# Patient Record
Sex: FEMALE | Race: WHITE | Employment: FULL TIME | URBAN - METROPOLITAN AREA
[De-identification: names, ages, dates, MRNs, and addresses within clinical notes are randomized per-mention and may not be internally consistent; named-entity substitution may affect disease eponyms.]

---

## 2017-10-23 RX ORDER — DROSPIRENONE, ETHINYL ESTRADIOL, LEVOMEFOLATE CALCIUM 3-0.02(24)
KIT ORAL
Qty: 84 TABLET | Refills: 3 | Status: ON HOLD | OUTPATIENT
Start: 2017-10-23 | End: 2018-09-12

## 2017-11-16 ENCOUNTER — OFFICE VISIT (OUTPATIENT)
Dept: OBGYN | Age: 42
End: 2017-11-16

## 2017-11-16 VITALS
WEIGHT: 144.8 LBS | HEIGHT: 63 IN | DIASTOLIC BLOOD PRESSURE: 68 MMHG | BODY MASS INDEX: 25.66 KG/M2 | SYSTOLIC BLOOD PRESSURE: 100 MMHG

## 2017-11-16 DIAGNOSIS — Z01.419 WELL WOMAN EXAM WITH ROUTINE GYNECOLOGICAL EXAM: Primary | ICD-10-CM

## 2017-11-16 DIAGNOSIS — Z11.51 SCREENING FOR HPV (HUMAN PAPILLOMAVIRUS): ICD-10-CM

## 2017-11-16 DIAGNOSIS — Z12.31 SCREENING MAMMOGRAM, ENCOUNTER FOR: ICD-10-CM

## 2017-11-16 PROCEDURE — 99396 PREV VISIT EST AGE 40-64: CPT | Performed by: OBSTETRICS & GYNECOLOGY

## 2017-11-16 RX ORDER — DROSPIRENONE, ETHINYL ESTRADIOL AND LEVOMEFOLATE CALCIUM AND LEVOMEFOLATE CALCIUM 3-0.02(24)
KIT ORAL
Qty: 90 TABLET | Refills: 3 | Status: SHIPPED | OUTPATIENT
Start: 2017-11-16 | End: 2018-11-27 | Stop reason: SDUPTHER

## 2017-11-16 RX ORDER — DROSPIRENONE, ETHINYL ESTRADIOL AND LEVOMEFOLATE CALCIUM AND LEVOMEFOLATE CALCIUM 3-0.02(24)
KIT ORAL
Qty: 84 TABLET | Refills: 3 | Status: CANCELLED | OUTPATIENT
Start: 2017-11-16

## 2017-11-16 NOTE — PROGRESS NOTES
on file. Medications/Contraceptives Affecting Cytology     Combination Contraceptives - Oral Disp Start End    MIMI 3-0.02-0.451 MG TABS 84 tablet 10/23/2017     Sig: TAKE 1 TABLET DAILY       Smoking status: Former Smoker                                                              Packs/day: 0.25      Years: 0.00         Start date: 1/1/1990     Quit date: 8/28/2014  Smokeless tobacco: Never Used                           Standing Status:   Future     Standing Expiration Date:   11/16/2018     Order Specific Question:   Collection Type     Answer:   SurePath     Order Specific Question:   Prior Abnormal Pap Test     Answer:   No     Order Specific Question:   Screening or Diagnostic     Answer:   Screening     Order Specific Question:   HPV Requested? Answer:   HPV Typing with HPV 16/18     Order Specific Question:   High Risk Patient     Answer:   N/A     No orders of the defined types were placed in this encounter. I, Renay Baker, am scribing for, and in the presence of, Dr Angel Gramajo. Electronically signed by: Renay Baker 11/16/17 10:55 AM    I, Dr Angel Gramajo, personally performed the services described in this documentation, as scribed by Renay Baker in my presence, and it is both accurate and complete.  Electronically signed by: Angel Gramajo MD 11/17/17 10:43 PM

## 2017-12-18 DIAGNOSIS — Z12.31 SCREENING MAMMOGRAM, ENCOUNTER FOR: ICD-10-CM

## 2018-09-11 ENCOUNTER — INITIAL CONSULT (OUTPATIENT)
Dept: SURGERY | Age: 43
End: 2018-09-11
Payer: COMMERCIAL

## 2018-09-11 ENCOUNTER — PREP FOR PROCEDURE (OUTPATIENT)
Dept: SURGERY | Age: 43
End: 2018-09-11

## 2018-09-11 ENCOUNTER — OFFICE VISIT (OUTPATIENT)
Dept: OBGYN CLINIC | Age: 43
End: 2018-09-11
Payer: COMMERCIAL

## 2018-09-11 VITALS
TEMPERATURE: 98 F | WEIGHT: 139 LBS | SYSTOLIC BLOOD PRESSURE: 130 MMHG | HEIGHT: 63 IN | OXYGEN SATURATION: 98 % | BODY MASS INDEX: 24.63 KG/M2 | DIASTOLIC BLOOD PRESSURE: 80 MMHG | HEART RATE: 88 BPM

## 2018-09-11 DIAGNOSIS — K61.1 PERIRECTAL ABSCESS: Primary | ICD-10-CM

## 2018-09-11 PROCEDURE — 99213 OFFICE O/P EST LOW 20 MIN: CPT | Performed by: OBSTETRICS & GYNECOLOGY

## 2018-09-11 PROCEDURE — 99204 OFFICE O/P NEW MOD 45 MIN: CPT | Performed by: SURGERY

## 2018-09-11 RX ORDER — ASCORBIC ACID 500 MG
500 TABLET ORAL DAILY
COMMUNITY

## 2018-09-11 RX ORDER — HEPARIN SODIUM 5000 [USP'U]/.5ML
5000 INJECTION, SOLUTION INTRAVENOUS; SUBCUTANEOUS
Status: CANCELLED | OUTPATIENT
Start: 2018-09-11 | End: 2018-09-11

## 2018-09-11 ASSESSMENT — ENCOUNTER SYMPTOMS
ABDOMINAL PAIN: 0
EYE DISCHARGE: 0
ANAL BLEEDING: 0
BACK PAIN: 0
CONSTIPATION: 0
STRIDOR: 0
TROUBLE SWALLOWING: 0
COLOR CHANGE: 0
VOMITING: 0
EYE PAIN: 0
SHORTNESS OF BREATH: 0
CHEST TIGHTNESS: 0

## 2018-09-11 NOTE — PROGRESS NOTES
by mouth      Drospiren-Eth Estrad-Levomefol (BEYAZ) 3-0.02-0.451 MG TABS TAKE 1 TABLET DAILY 90 tablet 3    MIMI 3-0.02-0.451 MG TABS TAKE 1 TABLET DAILY 84 tablet 3    folic acid (FOLVITE) 1 MG tablet       vitamin B-1 (THIAMINE) 100 MG tablet   2     No current facility-administered medications for this visit. No Known Allergies      Review of Systems   Constitutional: Negative for chills, fatigue, fever and unexpected weight change. HENT: Negative for nosebleeds and trouble swallowing. Eyes: Negative for pain and discharge. Respiratory: Negative for chest tightness, shortness of breath and stridor. Cardiovascular: Negative for chest pain, palpitations and leg swelling. Gastrointestinal: Negative for abdominal pain, anal bleeding, constipation and vomiting. Genitourinary: Negative for difficulty urinating, dysuria and hematuria. Musculoskeletal: Negative for back pain, joint swelling and neck pain. Skin: Negative for color change, rash and wound. Neurological: Negative for seizures, facial asymmetry, speech difficulty and headaches. Hematological: Negative for adenopathy. Does not bruise/bleed easily. Psychiatric/Behavioral: Negative for behavioral problems and hallucinations. The patient is not nervous/anxious. Vitals:    09/11/18 1228   BP: 130/80   Pulse: 88   Temp: 98 °F (36.7 °C)   SpO2: 98%           Physical Exam   Constitutional: She is oriented to person, place, and time. She appears well-developed and well-nourished. HENT:   Head: Normocephalic and atraumatic. Eyes: Pupils are equal, round, and reactive to light. EOM are normal.   Neck: Normal range of motion. Neck supple. Cardiovascular: Normal rate and regular rhythm. No murmur heard. Pulmonary/Chest: Effort normal and breath sounds normal. No respiratory distress. She has no wheezes. She has no rales. Abdominal: She exhibits no distension and no mass. There is no tenderness.  There is no

## 2018-09-12 ENCOUNTER — ANESTHESIA EVENT (OUTPATIENT)
Dept: OPERATING ROOM | Age: 43
End: 2018-09-12
Payer: COMMERCIAL

## 2018-09-12 ENCOUNTER — ANESTHESIA (OUTPATIENT)
Dept: OPERATING ROOM | Age: 43
End: 2018-09-12
Payer: COMMERCIAL

## 2018-09-12 ENCOUNTER — HOSPITAL ENCOUNTER (OUTPATIENT)
Age: 43
Setting detail: OUTPATIENT SURGERY
Discharge: HOME | End: 2018-09-12
Attending: SURGERY | Admitting: SURGERY
Payer: COMMERCIAL

## 2018-09-12 VITALS — SYSTOLIC BLOOD PRESSURE: 86 MMHG | DIASTOLIC BLOOD PRESSURE: 49 MMHG | OXYGEN SATURATION: 100 % | TEMPERATURE: 97.5 F

## 2018-09-12 VITALS
HEART RATE: 66 BPM | HEIGHT: 63 IN | RESPIRATION RATE: 14 BRPM | TEMPERATURE: 97.8 F | BODY MASS INDEX: 23.92 KG/M2 | WEIGHT: 135 LBS | DIASTOLIC BLOOD PRESSURE: 68 MMHG | SYSTOLIC BLOOD PRESSURE: 109 MMHG | OXYGEN SATURATION: 99 %

## 2018-09-12 LAB
HCT VFR BLD CALC: 40.3 % (ref 37–47)
HEMOGLOBIN: 13.8 G/DL (ref 12–16)
MCH RBC QN AUTO: 31.2 PG (ref 27–31.3)
MCHC RBC AUTO-ENTMCNC: 34.4 % (ref 33–37)
MCV RBC AUTO: 90.8 FL (ref 82–100)
PDW BLD-RTO: 12.2 % (ref 11.5–14.5)
PLATELET # BLD: 278 K/UL (ref 130–400)
RBC # BLD: 4.43 M/UL (ref 4.2–5.4)
WBC # BLD: 11.4 K/UL (ref 4.8–10.8)

## 2018-09-12 PROCEDURE — 2500000003 HC RX 250 WO HCPCS: Performed by: NURSE ANESTHETIST, CERTIFIED REGISTERED

## 2018-09-12 PROCEDURE — 87205 SMEAR GRAM STAIN: CPT

## 2018-09-12 PROCEDURE — 7100000001 HC PACU RECOVERY - ADDTL 15 MIN: Performed by: SURGERY

## 2018-09-12 PROCEDURE — 6360000002 HC RX W HCPCS: Performed by: SURGERY

## 2018-09-12 PROCEDURE — 6360000002 HC RX W HCPCS: Performed by: NURSE ANESTHETIST, CERTIFIED REGISTERED

## 2018-09-12 PROCEDURE — 7100000000 HC PACU RECOVERY - FIRST 15 MIN: Performed by: SURGERY

## 2018-09-12 PROCEDURE — 7100000010 HC PHASE II RECOVERY - FIRST 15 MIN: Performed by: SURGERY

## 2018-09-12 PROCEDURE — 87075 CULTR BACTERIA EXCEPT BLOOD: CPT

## 2018-09-12 PROCEDURE — 3600000003 HC SURGERY LEVEL 3 BASE: Performed by: SURGERY

## 2018-09-12 PROCEDURE — 7100000011 HC PHASE II RECOVERY - ADDTL 15 MIN: Performed by: SURGERY

## 2018-09-12 PROCEDURE — 85027 COMPLETE CBC AUTOMATED: CPT

## 2018-09-12 PROCEDURE — 46040 I&D ISCHIORCT&/PERIRCT ABSC: CPT | Performed by: SURGERY

## 2018-09-12 PROCEDURE — 3700000001 HC ADD 15 MINUTES (ANESTHESIA): Performed by: SURGERY

## 2018-09-12 PROCEDURE — 2580000003 HC RX 258

## 2018-09-12 PROCEDURE — 3700000000 HC ANESTHESIA ATTENDED CARE: Performed by: SURGERY

## 2018-09-12 PROCEDURE — 2580000003 HC RX 258: Performed by: SURGERY

## 2018-09-12 PROCEDURE — 3600000013 HC SURGERY LEVEL 3 ADDTL 15MIN: Performed by: SURGERY

## 2018-09-12 PROCEDURE — 87185 SC STD ENZYME DETCJ PER NZM: CPT

## 2018-09-12 PROCEDURE — 87186 SC STD MICRODIL/AGAR DIL: CPT

## 2018-09-12 PROCEDURE — 2709999900 HC NON-CHARGEABLE SUPPLY: Performed by: SURGERY

## 2018-09-12 PROCEDURE — 87077 CULTURE AEROBIC IDENTIFY: CPT

## 2018-09-12 PROCEDURE — A4648 IMPLANTABLE TISSUE MARKER: HCPCS | Performed by: SURGERY

## 2018-09-12 PROCEDURE — 87070 CULTURE OTHR SPECIMN AEROBIC: CPT

## 2018-09-12 PROCEDURE — 6370000000 HC RX 637 (ALT 250 FOR IP): Performed by: ANESTHESIOLOGY

## 2018-09-12 RX ORDER — FENTANYL CITRATE 50 UG/ML
INJECTION, SOLUTION INTRAMUSCULAR; INTRAVENOUS PRN
Status: DISCONTINUED | OUTPATIENT
Start: 2018-09-12 | End: 2018-09-12 | Stop reason: SDUPTHER

## 2018-09-12 RX ORDER — DEXAMETHASONE SODIUM PHOSPHATE 10 MG/ML
INJECTION INTRAMUSCULAR; INTRAVENOUS PRN
Status: DISCONTINUED | OUTPATIENT
Start: 2018-09-12 | End: 2018-09-12 | Stop reason: SDUPTHER

## 2018-09-12 RX ORDER — METOCLOPRAMIDE HYDROCHLORIDE 5 MG/ML
10 INJECTION INTRAMUSCULAR; INTRAVENOUS
Status: DISCONTINUED | OUTPATIENT
Start: 2018-09-12 | End: 2018-09-12 | Stop reason: HOSPADM

## 2018-09-12 RX ORDER — DEXTROSE, SODIUM CHLORIDE, SODIUM LACTATE, POTASSIUM CHLORIDE, AND CALCIUM CHLORIDE 5; .6; .31; .03; .02 G/100ML; G/100ML; G/100ML; G/100ML; G/100ML
INJECTION, SOLUTION INTRAVENOUS CONTINUOUS
Status: DISCONTINUED | OUTPATIENT
Start: 2018-09-12 | End: 2018-09-12 | Stop reason: HOSPADM

## 2018-09-12 RX ORDER — DIPHENHYDRAMINE HYDROCHLORIDE 50 MG/ML
12.5 INJECTION INTRAMUSCULAR; INTRAVENOUS
Status: DISCONTINUED | OUTPATIENT
Start: 2018-09-12 | End: 2018-09-12 | Stop reason: HOSPADM

## 2018-09-12 RX ORDER — HEPARIN SODIUM 5000 [USP'U]/ML
5000 INJECTION, SOLUTION INTRAVENOUS; SUBCUTANEOUS
Status: COMPLETED | OUTPATIENT
Start: 2018-09-12 | End: 2018-09-12

## 2018-09-12 RX ORDER — KETOROLAC TROMETHAMINE 30 MG/ML
INJECTION, SOLUTION INTRAMUSCULAR; INTRAVENOUS PRN
Status: DISCONTINUED | OUTPATIENT
Start: 2018-09-12 | End: 2018-09-12 | Stop reason: SDUPTHER

## 2018-09-12 RX ORDER — ACETAMINOPHEN AND CODEINE PHOSPHATE 300; 30 MG/1; MG/1
2 TABLET ORAL EVERY 4 HOURS PRN
Status: DISCONTINUED | OUTPATIENT
Start: 2018-09-12 | End: 2018-09-12 | Stop reason: HOSPADM

## 2018-09-12 RX ORDER — PROPOFOL 10 MG/ML
INJECTION, EMULSION INTRAVENOUS PRN
Status: DISCONTINUED | OUTPATIENT
Start: 2018-09-12 | End: 2018-09-12 | Stop reason: SDUPTHER

## 2018-09-12 RX ORDER — KETOROLAC TROMETHAMINE 30 MG/ML
30 INJECTION, SOLUTION INTRAMUSCULAR; INTRAVENOUS EVERY 6 HOURS
Status: DISCONTINUED | OUTPATIENT
Start: 2018-09-12 | End: 2018-09-12 | Stop reason: HOSPADM

## 2018-09-12 RX ORDER — SODIUM CHLORIDE, SODIUM LACTATE, POTASSIUM CHLORIDE, CALCIUM CHLORIDE 600; 310; 30; 20 MG/100ML; MG/100ML; MG/100ML; MG/100ML
INJECTION, SOLUTION INTRAVENOUS
Status: COMPLETED
Start: 2018-09-12 | End: 2018-09-12

## 2018-09-12 RX ORDER — MIDAZOLAM HYDROCHLORIDE 1 MG/ML
INJECTION INTRAMUSCULAR; INTRAVENOUS PRN
Status: DISCONTINUED | OUTPATIENT
Start: 2018-09-12 | End: 2018-09-12 | Stop reason: SDUPTHER

## 2018-09-12 RX ORDER — MEPERIDINE HYDROCHLORIDE 25 MG/ML
12.5 INJECTION INTRAMUSCULAR; INTRAVENOUS; SUBCUTANEOUS EVERY 5 MIN PRN
Status: DISCONTINUED | OUTPATIENT
Start: 2018-09-12 | End: 2018-09-12 | Stop reason: HOSPADM

## 2018-09-12 RX ORDER — HYDROCODONE BITARTRATE AND ACETAMINOPHEN 5; 325 MG/1; MG/1
1 TABLET ORAL PRN
Status: COMPLETED | OUTPATIENT
Start: 2018-09-12 | End: 2018-09-12

## 2018-09-12 RX ORDER — ONDANSETRON 2 MG/ML
4 INJECTION INTRAMUSCULAR; INTRAVENOUS
Status: DISCONTINUED | OUTPATIENT
Start: 2018-09-12 | End: 2018-09-12 | Stop reason: HOSPADM

## 2018-09-12 RX ORDER — MORPHINE SULFATE 2 MG/ML
2 INJECTION, SOLUTION INTRAMUSCULAR; INTRAVENOUS
Status: DISCONTINUED | OUTPATIENT
Start: 2018-09-12 | End: 2018-09-12 | Stop reason: HOSPADM

## 2018-09-12 RX ORDER — ONDANSETRON 2 MG/ML
INJECTION INTRAMUSCULAR; INTRAVENOUS PRN
Status: DISCONTINUED | OUTPATIENT
Start: 2018-09-12 | End: 2018-09-12 | Stop reason: SDUPTHER

## 2018-09-12 RX ORDER — ACETAMINOPHEN AND CODEINE PHOSPHATE 300; 30 MG/1; MG/1
1 TABLET ORAL EVERY 4 HOURS PRN
Status: DISCONTINUED | OUTPATIENT
Start: 2018-09-12 | End: 2018-09-12 | Stop reason: HOSPADM

## 2018-09-12 RX ORDER — LIDOCAINE HYDROCHLORIDE 20 MG/ML
INJECTION, SOLUTION EPIDURAL; INFILTRATION; INTRACAUDAL; PERINEURAL PRN
Status: DISCONTINUED | OUTPATIENT
Start: 2018-09-12 | End: 2018-09-12 | Stop reason: SDUPTHER

## 2018-09-12 RX ORDER — SODIUM CHLORIDE, SODIUM LACTATE, POTASSIUM CHLORIDE, CALCIUM CHLORIDE 600; 310; 30; 20 MG/100ML; MG/100ML; MG/100ML; MG/100ML
INJECTION, SOLUTION INTRAVENOUS CONTINUOUS
Status: DISCONTINUED | OUTPATIENT
Start: 2018-09-12 | End: 2018-09-12 | Stop reason: HOSPADM

## 2018-09-12 RX ORDER — FENTANYL CITRATE 50 UG/ML
50 INJECTION, SOLUTION INTRAMUSCULAR; INTRAVENOUS EVERY 10 MIN PRN
Status: DISCONTINUED | OUTPATIENT
Start: 2018-09-12 | End: 2018-09-12 | Stop reason: HOSPADM

## 2018-09-12 RX ORDER — HYDROCODONE BITARTRATE AND ACETAMINOPHEN 5; 325 MG/1; MG/1
2 TABLET ORAL PRN
Status: COMPLETED | OUTPATIENT
Start: 2018-09-12 | End: 2018-09-12

## 2018-09-12 RX ORDER — MORPHINE SULFATE 4 MG/ML
4 INJECTION, SOLUTION INTRAMUSCULAR; INTRAVENOUS
Status: DISCONTINUED | OUTPATIENT
Start: 2018-09-12 | End: 2018-09-12 | Stop reason: HOSPADM

## 2018-09-12 RX ADMIN — CEFOXITIN SODIUM 1 G: 1 POWDER, FOR SOLUTION INTRAVENOUS at 12:34

## 2018-09-12 RX ADMIN — MIDAZOLAM HYDROCHLORIDE 2 MG: 1 INJECTION, SOLUTION INTRAMUSCULAR; INTRAVENOUS at 12:28

## 2018-09-12 RX ADMIN — SODIUM CHLORIDE, POTASSIUM CHLORIDE, SODIUM LACTATE AND CALCIUM CHLORIDE: 600; 310; 30; 20 INJECTION, SOLUTION INTRAVENOUS at 10:17

## 2018-09-12 RX ADMIN — HYDROCODONE BITARTRATE AND ACETAMINOPHEN 1 TABLET: 5; 325 TABLET ORAL at 14:33

## 2018-09-12 RX ADMIN — FENTANYL CITRATE 25 MCG: 50 INJECTION, SOLUTION INTRAMUSCULAR; INTRAVENOUS at 12:46

## 2018-09-12 RX ADMIN — ONDANSETRON HYDROCHLORIDE 4 MG: 2 INJECTION, SOLUTION INTRAMUSCULAR; INTRAVENOUS at 12:39

## 2018-09-12 RX ADMIN — LIDOCAINE HYDROCHLORIDE 100 MG: 20 INJECTION, SOLUTION EPIDURAL; INFILTRATION; INTRACAUDAL; PERINEURAL at 12:30

## 2018-09-12 RX ADMIN — SODIUM CHLORIDE, SODIUM LACTATE, POTASSIUM CHLORIDE, CALCIUM CHLORIDE: 600; 310; 30; 20 INJECTION, SOLUTION INTRAVENOUS at 10:17

## 2018-09-12 RX ADMIN — PROPOFOL 200 MG: 10 INJECTION, EMULSION INTRAVENOUS at 12:30

## 2018-09-12 RX ADMIN — KETOROLAC TROMETHAMINE 30 MG: 30 INJECTION, SOLUTION INTRAMUSCULAR at 12:39

## 2018-09-12 RX ADMIN — HEPARIN SODIUM 5000 UNITS: 5000 INJECTION, SOLUTION INTRAVENOUS; SUBCUTANEOUS at 10:42

## 2018-09-12 RX ADMIN — DEXAMETHASONE SODIUM PHOSPHATE 10 MG: 10 INJECTION INTRAMUSCULAR; INTRAVENOUS at 12:34

## 2018-09-12 RX ADMIN — FENTANYL CITRATE 25 MCG: 50 INJECTION, SOLUTION INTRAMUSCULAR; INTRAVENOUS at 12:30

## 2018-09-12 ASSESSMENT — PAIN DESCRIPTION - DESCRIPTORS: DESCRIPTORS: BURNING

## 2018-09-12 ASSESSMENT — PULMONARY FUNCTION TESTS
PIF_VALUE: 10
PIF_VALUE: 3
PIF_VALUE: 2
PIF_VALUE: 12
PIF_VALUE: 10
PIF_VALUE: 1
PIF_VALUE: 12
PIF_VALUE: 2
PIF_VALUE: 1
PIF_VALUE: 10
PIF_VALUE: 2
PIF_VALUE: 10
PIF_VALUE: 12
PIF_VALUE: 10
PIF_VALUE: 10
PIF_VALUE: 3
PIF_VALUE: 2
PIF_VALUE: 1
PIF_VALUE: 2
PIF_VALUE: 24
PIF_VALUE: 12
PIF_VALUE: 12
PIF_VALUE: 2
PIF_VALUE: 1

## 2018-09-12 ASSESSMENT — PAIN - FUNCTIONAL ASSESSMENT: PAIN_FUNCTIONAL_ASSESSMENT: 0-10

## 2018-09-12 ASSESSMENT — PAIN SCALES - GENERAL
PAINLEVEL_OUTOF10: 1
PAINLEVEL_OUTOF10: 0

## 2018-09-12 NOTE — H&P
[address]                       [address], [address] [facility]                               HISTORY AND PHYSICAL    PATIENT NAME: Marla Helton                    :        1975  MED REC NO:   [id number]                            ROOM:  ACCOUNT NO:   [de-identified]                           ADMIT DATE: 2018  PROVIDER:     Ketty Aldana MD    CHIEF COMPLAINT:  Perirectal abscess. HISTORY OF PRESENT ILLNESS:  The patient is a 78-year-old female with  perirectal abscess. She presents now for incision and drainage. Patient has been sick for 4 or 5 days. She has been on oral antibiotics. Prior exam in the office demonstrates an abscess. She presents now for  incision and drainage. The patient has never had the problem before. The patient denies any chronic issues with rectal bleeding, diarrhea,  change in bowel habits. She never had a colonoscopy. There is no family  history of colon disease. PAST MEDICAL HISTORY:  The patient denies any hypertension, diabetes or  coronary artery disease. SOCIAL HISTORY:  She does not smoke. The patient has prior history of  alcohol abuse. She has been sober for 3 years. CURRENT MEDICATIONS:  Include oral contraceptives and vitamins. ALLERGIES:  NKDA. PHYSICAL EXAMINATION:  GENERAL:  Female in mild discomfort. HEENT:  Sclerae are clear. NECK:  No cervical adenopathy. No neck mass. CHEST:  Clear. CARDIAC:  No S3 or murmur. ABDOMEN:  Soft and nontender. RECTAL:  Rectal exam was performed with female nursing personnel present as  chaperone. There is an abscess to the left and anterior of the anus. Digital exam was not performed. ASSESSMENT:  Perirectal abscess. PLAN:  The patient will undergo an incision and drainage in the operating  room under general anesthesia. Risks, benefits, indications for the surgery. Anticipated convalescence  was discussed.   I have told the

## 2018-09-12 NOTE — PROGRESS NOTES
Disch instr reviewed with patient and her daughter. Portable sitz bath set up given to patient, and instructed on use. Both verbalize understanding & would like to proceed with discharge.

## 2018-09-13 LAB
GRAM STAIN RESULT: ABNORMAL
ORGANISM: ABNORMAL
WOUND/ABSCESS: ABNORMAL
WOUND/ABSCESS: ABNORMAL

## 2018-09-13 NOTE — OP NOTE
T: 09/12/2018 21:57:37     [id number]  Job#: [id number]     Doc#: [id number]    CC:  MD Karen Mccarty MD

## 2018-09-14 VITALS
DIASTOLIC BLOOD PRESSURE: 80 MMHG | HEIGHT: 63 IN | WEIGHT: 139 LBS | TEMPERATURE: 98.9 F | BODY MASS INDEX: 24.63 KG/M2 | SYSTOLIC BLOOD PRESSURE: 130 MMHG

## 2018-09-14 LAB
ANAEROBIC CULTURE: ABNORMAL
CULTURE SURGICAL: ABNORMAL
CULTURE SURGICAL: ABNORMAL
GRAM STAIN RESULT: ABNORMAL
ORGANISM: ABNORMAL

## 2018-09-14 ASSESSMENT — ENCOUNTER SYMPTOMS
CONSTIPATION: 0
COUGH: 0
BLOOD IN STOOL: 0
WHEEZING: 0
SHORTNESS OF BREATH: 0
DIARRHEA: 0
ABDOMINAL PAIN: 0

## 2018-09-14 NOTE — PROGRESS NOTES
History and Physical  [facility] [address]  P: [phone] / F: [phone]  Nj oJhns  2018              37 y.o. Chief Complaint   Patient presents with    Mass     existing pt states that 18 started with lump on her left buttock and concerned that now the size has increased and in pain    Student     yes       /80   Temp 98.9 °F (37.2 °C)   Ht 5' 3\" (1.6 m)   Wt 139 lb (63 kg)   LMP 2018   BMI 24.62 kg/m²   Alllergies:  Patient has no known allergies. Primary Care Physician: Rupert Nicolas MD    HPI : Nj Johns 37 y.o. [place] Ethiop[place] female  Pt here today with c/o small area on her left buttock that has grown larger and became more uncomfortable over the last 6 days. Pt states the pain and discomfort worsens when she does more rigorous activity such as workouts, lunges and training at the gym. She states she now has a large painful mass, pain intensify's when sitting. On exam pt with an 8 cm ishiorectal fossa, full and tense with mehrdad rectal abscess which was draining a small amount purulent fluid into the recto anal area when milking with one finger from the vagina and putting pressure on the buttock area. Area is exquisitely tender and fluctuant, culture obtained and sent to lab. Pt is afebrile, she denies chills. Call placed to Dr. Dave Barron office (gen surg) for same day consult. Pt sent directly to general surgery following this appointment. All? S answered   ________________________________________________________________________   Obstetric History       T0      L0     SAB0   TAB0   Ectopic0   Molar0   Multiple0   Live Births0         Past Medical History:   Diagnosis Date    Anxiety disorder     DDD (degenerative disc disease), lumbar     Peripheral neuropathy     alcohol induced    Radiculopathy     Recovering alcoholic ([address] difficulty urinating, dysuria, flank pain and hematuria. Skin: Negative. Psychiatric/Behavioral: Negative. PHYSICAL Exam:    Constitutional:  Vitals:    09/11/18 1127   BP: 130/80   Temp: 98.9 °F (37.2 °C)   Weight: 139 lb (63 kg)   Height: 5' 3\" (1.6 m)       Physical Exam   Constitutional: She is oriented to person, place, and time. She appears well-developed and well-nourished. HENT:   Head: Normocephalic and atraumatic. Cardiovascular: Normal rate and regular rhythm. Abdominal: Soft. Normal appearance. There is no tenderness. Musculoskeletal: Normal range of motion. Neurological: She is alert and oriented to person, place, and time. Skin: Skin is warm and intact. No lesion noted. No erythema. Psychiatric: She has a normal mood and affect. Her behavior is normal. Judgment and thought content normal.         ASSESSMENT:      37 y.o. Annual   Diagnosis Orders   1. Bump  Wound Culture                    Counseling Completed:          PLAN:      Orders Placed This Encounter   Procedures    Wound Culture     perirectal     Standing Status:   Future     Number of Occurrences:   1     Standing Expiration Date:   9/11/2019     No orders of the defined types were placed in this encounter. IDeandre, am scribing for, and in the presence of, Dr Paolo Stroud. Electronically signed by: Deandre Lima. 9/11/18      TONO, Dr Paolo Stroud, personally performed the services described in this documentation, as scribed by Deandre Lima in my presence, and it is both accurate and complete.  Electronically signed by: Paolo Stroud MD 9/28/18 7:03 AM

## 2018-09-18 ENCOUNTER — OFFICE VISIT (OUTPATIENT)
Dept: SURGERY | Age: 43
End: 2018-09-18

## 2018-09-18 VITALS
HEART RATE: 66 BPM | OXYGEN SATURATION: 99 % | TEMPERATURE: 98.7 F | HEIGHT: 63 IN | BODY MASS INDEX: 24.17 KG/M2 | WEIGHT: 136.4 LBS | SYSTOLIC BLOOD PRESSURE: 110 MMHG | DIASTOLIC BLOOD PRESSURE: 70 MMHG

## 2018-09-18 DIAGNOSIS — K61.1 PERIRECTAL ABSCESS: Primary | ICD-10-CM

## 2018-09-18 PROCEDURE — 99024 POSTOP FOLLOW-UP VISIT: CPT | Performed by: SURGERY

## 2018-09-18 RX ORDER — AMOXICILLIN AND CLAVULANATE POTASSIUM 875; 125 MG/1; MG/1
TABLET, FILM COATED ORAL
Refills: 0 | COMMUNITY
Start: 2018-09-12 | End: 2018-12-14

## 2018-10-16 ENCOUNTER — OFFICE VISIT (OUTPATIENT)
Dept: SURGERY | Age: 43
End: 2018-10-16

## 2018-10-16 VITALS
BODY MASS INDEX: 23.39 KG/M2 | OXYGEN SATURATION: 98 % | DIASTOLIC BLOOD PRESSURE: 68 MMHG | TEMPERATURE: 98.8 F | WEIGHT: 137 LBS | HEART RATE: 74 BPM | SYSTOLIC BLOOD PRESSURE: 108 MMHG | HEIGHT: 64 IN

## 2018-10-16 DIAGNOSIS — K61.1 PERIRECTAL ABSCESS: Primary | ICD-10-CM

## 2018-10-16 PROCEDURE — 99024 POSTOP FOLLOW-UP VISIT: CPT | Performed by: SURGERY

## 2018-10-16 NOTE — PROGRESS NOTES
S/P 9/12 I&D perirectal abscess        Mostly healed  No pain  Still draining    PE/    Nursing personnel present during the physical exam acting as chaperone.       I&D site open for several mm  Scant drainage    No fluctuance      A/ generally improved    Possible fistula    P/    Colonoscopy with DR Daniel Vera to r/o colorectal disease    If drainage persists , then needs evaluation for fistula

## 2018-10-18 RX ORDER — SODIUM, POTASSIUM,MAG SULFATES 17.5-3.13G
SOLUTION, RECONSTITUTED, ORAL ORAL
Qty: 1 BOTTLE | Refills: 0 | Status: SHIPPED | OUTPATIENT
Start: 2018-10-18 | End: 2019-12-03 | Stop reason: CLARIF

## 2018-11-19 ENCOUNTER — OUTSIDE SERVICES (OUTPATIENT)
Dept: GASTROENTEROLOGY | Age: 43
End: 2018-11-19
Payer: COMMERCIAL

## 2018-11-19 DIAGNOSIS — K61.1 PERIRECTAL ABSCESS: Primary | ICD-10-CM

## 2018-11-19 PROCEDURE — 45380 COLONOSCOPY AND BIOPSY: CPT | Performed by: INTERNAL MEDICINE

## 2018-11-19 NOTE — OP NOTE
Colonoscopy Procedure Note      Patient: Moira Nelson  : 1975    Procedure: Colonoscopy with polypectomy (cold biopsy)    Date:  2018    Surgeon:  Carlyn Sutton MD    Referring Physician:  Igor Hyde MD    Preoperative Diagnosis:  History of perirectal abscess, colonoscopy to exclude IBD    Postoperative Diagnosis:  Melanosis coli, diminutive rectal polyp, small internal hemorrhoids, medium size external hemorrhoids, anal fissure    Consent:  The patient or their legal guardian has signed a consent, and is aware of the potential risks, benefits, alternatives, and potential complications of this procedure. These include, but are not limited to hemorrhage, bleeding, post procedural pain, perforation, phlebitis, aspiration, hypotension, hypoxia, cardiovascular events such as arryhthmia, and possibly death. Additionally, the possibility of missed colonic polyps and interval colon cancer was discussed in the consent. Anesthesia:  MAC    Procedure: An informed consent was obtained from the patient after explanation of indications, benefits, possible risks and complications of the procedure. The patient was then taken to the endoscopy suite, placed in the left lateral decubitus position, and the above IV anesthesia was administered. A digital rectal examination was performed and revealed external hemorrhoids noted, evidence for anal fissure versus persisting perirectal abscess with mild drainage. The Olympus video colonoscope was placed in the patient's rectum under digital direction and advanced to the cecum. The cecum was identified by characteristic anatomy and confirmed with presence ileo-cecal valve, appendical orifice and transillumination of right lower quadrant. Photo documentation of cecum was performed. The prep was good. The scope was then withdrawn back through the cecum, ascending, transverse, descending, sigmoid colon, and rectum.   Careful circumferential

## 2018-11-23 ENCOUNTER — OFFICE VISIT (OUTPATIENT)
Dept: SURGERY | Age: 43
End: 2018-11-23
Payer: COMMERCIAL

## 2018-11-23 VITALS — BODY MASS INDEX: 24.07 KG/M2 | WEIGHT: 141 LBS | HEIGHT: 64 IN | TEMPERATURE: 97.7 F

## 2018-11-23 DIAGNOSIS — K60.3 ANAL FISTULA: Primary | ICD-10-CM

## 2018-11-23 PROCEDURE — 99203 OFFICE O/P NEW LOW 30 MIN: CPT | Performed by: COLON & RECTAL SURGERY

## 2018-11-23 PROCEDURE — 46600 DIAGNOSTIC ANOSCOPY SPX: CPT | Performed by: COLON & RECTAL SURGERY

## 2018-11-23 ASSESSMENT — ENCOUNTER SYMPTOMS
BLOOD IN STOOL: 0
ANAL BLEEDING: 0
ABDOMINAL PAIN: 0
SHORTNESS OF BREATH: 0
CHEST TIGHTNESS: 0
NAUSEA: 0
RECTAL PAIN: 0
ABDOMINAL DISTENTION: 0

## 2018-11-23 NOTE — PROGRESS NOTES
treatment versus exam under anesthesia with anal fistulotomy versus seton placement. Risks of infection bleeding and recurrence were outlined. Damage to the sphincter resulting in incontinence to gas or liquid stool was addressed as well. She understands the risks but also the benefits and wishes to proceed. Plan for operating room for exam under anesthesia and fistulotomy versus seton placement. Please note this report has beenpartially produced using speech recognition software and may cause contain errors related to that system including grammar, punctuation and spelling as well as words and phrases that may seem inappropriate.  If there arequestions or concerns please feel free to contact me to clarify

## 2018-11-27 ENCOUNTER — OFFICE VISIT (OUTPATIENT)
Dept: OBGYN CLINIC | Age: 43
End: 2018-11-27
Payer: COMMERCIAL

## 2018-11-27 VITALS
SYSTOLIC BLOOD PRESSURE: 118 MMHG | WEIGHT: 135 LBS | BODY MASS INDEX: 23.05 KG/M2 | DIASTOLIC BLOOD PRESSURE: 70 MMHG | HEIGHT: 64 IN

## 2018-11-27 DIAGNOSIS — Z11.51 SCREENING FOR HUMAN PAPILLOMAVIRUS: ICD-10-CM

## 2018-11-27 DIAGNOSIS — Z01.419 PAP SMEAR, AS PART OF ROUTINE GYNECOLOGICAL EXAMINATION: Primary | ICD-10-CM

## 2018-11-27 DIAGNOSIS — Z12.31 SCREENING MAMMOGRAM, ENCOUNTER FOR: ICD-10-CM

## 2018-11-27 PROCEDURE — 99396 PREV VISIT EST AGE 40-64: CPT | Performed by: OBSTETRICS & GYNECOLOGY

## 2018-11-27 ASSESSMENT — ENCOUNTER SYMPTOMS
ABDOMINAL PAIN: 0
VOMITING: 0
RECTAL PAIN: 0
ALLERGIC/IMMUNOLOGIC NEGATIVE: 1
BLOOD IN STOOL: 0
RESPIRATORY NEGATIVE: 1
CONSTIPATION: 0
NAUSEA: 0
ABDOMINAL DISTENTION: 0
EYES NEGATIVE: 1
DIARRHEA: 0
ANAL BLEEDING: 0

## 2018-11-27 ASSESSMENT — PATIENT HEALTH QUESTIONNAIRE - PHQ9
1. LITTLE INTEREST OR PLEASURE IN DOING THINGS: 0
SUM OF ALL RESPONSES TO PHQ QUESTIONS 1-9: 0
2. FEELING DOWN, DEPRESSED OR HOPELESS: 0
SUM OF ALL RESPONSES TO PHQ QUESTIONS 1-9: 0
SUM OF ALL RESPONSES TO PHQ9 QUESTIONS 1 & 2: 0

## 2018-11-27 NOTE — PROGRESS NOTES
Subjective:      Patient ID: Bella Hankins is a 37 y.o. female    Annual exam.  No GYN complaints. Did develop anal fistula from recent mehrdad-rectal abscess and having repair soon. Normal cycles. Pap performed and screening mammogram ordered. STD screening offered. Monthly SBE encouraged. F/U annual or prn. Vitals:  /70   Ht 5' 3.5\" (1.613 m)   Wt 135 lb (61.2 kg)   LMP 11/18/2018   BMI 23.54 kg/m²   Past Medical History:   Diagnosis Date    Anxiety disorder     DDD (degenerative disc disease), lumbar     Fistula     Anal     Peripheral neuropathy     alcohol induced    Radiculopathy     Recovering alcoholic ([address]      Past Surgical History:   Procedure Laterality Date    COLONOSCOPY      IA I&D RECTAL SUBMUCOSAL ABSCESS N/A 9/12/2018    RECTAL ABSCESS INCISION AND DRAINAGE, PAT ON ADMIT performed by Flaquita Cortes MD at [facility]:  Patient has no known allergies. Current Outpatient Prescriptions   Medication Sig Dispense Refill    Na Sulfate-K Sulfate-Mg Sulf 17.5-3.13-1.6 GM/180ML SOLN Use as directed in split fashion 1 Bottle 0    amoxicillin-clavulanate (AUGMENTIN) 875-125 MG per tablet TAKE 1 TABLET TWICE DAILY  0    vitamin D (CHOLECALCIFEROL) 1000 UNIT TABS tablet Take 1,000 Units by mouth daily      vitamin C (ASCORBIC ACID) 500 MG tablet Take 500 mg by mouth daily      Glucosamine-Chondroitin (GLUCOSAMINE CHONDR COMPLEX PO) Take by mouth      Drospiren-Eth Estrad-Levomefol (BEYAZ) 3-0.02-0.451 MG TABS TAKE 1 TABLET DAILY 90 tablet 3    folic acid (FOLVITE) 1 MG tablet       vitamin B-1 (THIAMINE) 100 MG tablet   2     No current facility-administered medications for this visit. Social History     Social History    Marital status:      Spouse name: N/A    Number of children: N/A    Years of education: N/A     Occupational History    Not on file.      Social History Main Topics    Smoking status: Current Every Day Smoker     Packs/day: 0.25     Start date: 1990     Last attempt to quit: 2014    Smokeless tobacco: Never Used    Alcohol use No      Comment: former alcoholic     Drug use: No    Sexual activity: Yes     Partners: Male     Birth control/ protection: Pill      Comment: OCP     Other Topics Concern    Not on file     Social History Narrative    No narrative on file     Family History   Problem Relation Age of Onset    Heart Disease Father     High Blood Pressure Father     Other Father         pancreatitis    Diabetes Maternal Grandmother     Diabetes Maternal Grandfather     Cancer Mother         [place] No Known Problems Paternal Grandfather     No Known Problems Paternal Grandmother     No Known Problems Brother     No Known Problems Sister     No Known Problems Other     Breast Cancer Neg Hx     Colon Cancer Neg Hx     Eclampsia Neg Hx     Hypertension Neg Hx     Ovarian Cancer Neg Hx      Labor Neg Hx     Spont Abortions Neg Hx     Stroke Neg Hx        Review of Systems   Constitutional: Negative. Negative for activity change, appetite change, chills, diaphoresis, fatigue, fever and unexpected weight change. HENT: Negative. Eyes: Negative. Respiratory: Negative. Cardiovascular: Negative. Gastrointestinal: Negative for abdominal distention, abdominal pain, anal bleeding, blood in stool, constipation, diarrhea, nausea, rectal pain and vomiting. Endocrine: Negative. Genitourinary: Negative for decreased urine volume, difficulty urinating, dyspareunia, dysuria, enuresis, flank pain, frequency, genital sores, hematuria, menstrual problem, pelvic pain, urgency, vaginal bleeding, vaginal discharge and vaginal pain. Musculoskeletal: Negative. Skin: Negative. Allergic/Immunologic: Negative. Neurological: Negative. Hematological: Negative. Psychiatric/Behavioral: Negative.         Objective:     Physical Exam   Constitutional: She is oriented to person, place, and

## 2018-11-29 RX ORDER — DROSPIRENONE, ETHINYL ESTRADIOL, LEVOMEFOLATE CALCIUM 3-0.02(24)
KIT ORAL
Qty: 84 TABLET | Refills: 3 | Status: SHIPPED | OUTPATIENT
Start: 2018-11-29 | End: 2019-10-31 | Stop reason: SDUPTHER

## 2018-12-06 DIAGNOSIS — Z01.419 PAP SMEAR, AS PART OF ROUTINE GYNECOLOGICAL EXAMINATION: ICD-10-CM

## 2018-12-06 DIAGNOSIS — Z11.51 SCREENING FOR HUMAN PAPILLOMAVIRUS: ICD-10-CM

## 2018-12-14 ENCOUNTER — OFFICE VISIT (OUTPATIENT)
Dept: SURGERY | Age: 43
End: 2018-12-14

## 2018-12-14 VITALS
DIASTOLIC BLOOD PRESSURE: 70 MMHG | WEIGHT: 136 LBS | HEIGHT: 64 IN | SYSTOLIC BLOOD PRESSURE: 110 MMHG | TEMPERATURE: 98.2 F | BODY MASS INDEX: 23.22 KG/M2

## 2018-12-14 DIAGNOSIS — K60.3 ANAL FISTULA: Primary | ICD-10-CM

## 2018-12-14 PROCEDURE — 99024 POSTOP FOLLOW-UP VISIT: CPT | Performed by: COLON & RECTAL SURGERY

## 2018-12-14 ASSESSMENT — ENCOUNTER SYMPTOMS
SHORTNESS OF BREATH: 0
ABDOMINAL DISTENTION: 0
CHEST TIGHTNESS: 0
ANAL BLEEDING: 0
RECTAL PAIN: 0
VOMITING: 0

## 2019-01-04 ENCOUNTER — OFFICE VISIT (OUTPATIENT)
Dept: SURGERY | Age: 44
End: 2019-01-04

## 2019-01-04 VITALS
BODY MASS INDEX: 24.63 KG/M2 | WEIGHT: 139 LBS | DIASTOLIC BLOOD PRESSURE: 78 MMHG | SYSTOLIC BLOOD PRESSURE: 116 MMHG | TEMPERATURE: 98.2 F | HEIGHT: 63 IN

## 2019-01-04 DIAGNOSIS — K60.3 ANAL FISTULA: Primary | ICD-10-CM

## 2019-01-04 PROCEDURE — 99024 POSTOP FOLLOW-UP VISIT: CPT | Performed by: COLON & RECTAL SURGERY

## 2019-01-04 ASSESSMENT — ENCOUNTER SYMPTOMS
SHORTNESS OF BREATH: 0
ANAL BLEEDING: 0
CONSTIPATION: 0
DIARRHEA: 0
ABDOMINAL PAIN: 0
ABDOMINAL DISTENTION: 0

## 2019-02-01 ENCOUNTER — OFFICE VISIT (OUTPATIENT)
Dept: SURGERY | Age: 44
End: 2019-02-01

## 2019-02-01 VITALS — WEIGHT: 141 LBS | HEIGHT: 63 IN | BODY MASS INDEX: 24.98 KG/M2 | TEMPERATURE: 98.6 F

## 2019-02-01 DIAGNOSIS — K60.3 ANAL FISTULA: Primary | ICD-10-CM

## 2019-02-01 PROCEDURE — 99024 POSTOP FOLLOW-UP VISIT: CPT | Performed by: COLON & RECTAL SURGERY

## 2019-03-28 ENCOUNTER — TELEPHONE (OUTPATIENT)
Dept: FAMILY MEDICINE CLINIC | Age: 44
End: 2019-03-28

## 2019-06-03 ENCOUNTER — TELEPHONE (OUTPATIENT)
Dept: SURGERY | Age: 44
End: 2019-06-03

## 2019-06-03 ENCOUNTER — TELEPHONE (OUTPATIENT)
Dept: OBGYN CLINIC | Age: 44
End: 2019-06-03

## 2019-06-03 NOTE — TELEPHONE ENCOUNTER
Patient called six months out from an anal fistulotomy stating that she has been experiencing leakage after bowel movements. She would like to discuss the symptoms that she is having with Dr. Dhara Pizano before coming in to the office.

## 2019-06-03 NOTE — TELEPHONE ENCOUNTER
Pt is on beyaz for b/c. Pt states that she has been taking a dandelion detox tea, and also melatonin . She is wondering  If either one of those will affect her b/c.   Please call her at [phone]

## 2019-06-14 ENCOUNTER — OFFICE VISIT (OUTPATIENT)
Dept: SURGERY | Age: 44
End: 2019-06-14

## 2019-06-14 VITALS
OXYGEN SATURATION: 98 % | HEIGHT: 63 IN | WEIGHT: 138 LBS | TEMPERATURE: 98.4 F | HEART RATE: 53 BPM | BODY MASS INDEX: 24.45 KG/M2

## 2019-06-14 DIAGNOSIS — K60.3 ANAL FISTULA: Primary | ICD-10-CM

## 2019-06-14 PROCEDURE — 99024 POSTOP FOLLOW-UP VISIT: CPT | Performed by: COLON & RECTAL SURGERY

## 2019-06-14 NOTE — PROGRESS NOTES
Subjective:      Patient ID: Tali Ruiz is a 53 y.o. female who presents for:  Chief Complaint   Patient presents with    Post-Op Check       She returns to the office about 6 months out from an anal fistulotomy for persistent refractory anal fistula. She is doing well but has some concerns about minimal anal leakage during times of liquid stool. She has excellent continence when her stool is solid but does have occasional leakage requiring wet wipes to be available. She denies abdominal pain. She denies rectal bleeding.       Past Medical History:   Diagnosis Date    Anxiety disorder     DDD (degenerative disc disease), lumbar     Fistula     Anal     Peripheral neuropathy     alcohol induced    Radiculopathy     Recovering alcoholic ([address]      Past Surgical History:   Procedure Laterality Date    COLONOSCOPY      RI I&D RECTAL SUBMUCOSAL ABSCESS N/A 2018    RECTAL ABSCESS INCISION AND DRAINAGE, PAT ON ADMIT performed by Sandra Whitaker MD at [address] [place] [address] History     Socioeconomic History    Marital status:      Spouse name: Not on file    Number of children: Not on file    Years of education: Not on file    Highest education level: Not on file   Occupational History    Not on file   Social Needs    Financial resource strain: Not on file    Food insecurity:     Worry: Not on file     Inability: Not on file    Transportation needs:     Medical: Not on file     Non-medical: Not on file   Tobacco Use    Smoking status: Former Smoker     Packs/day: 0.25     Start date: 1990     Last attempt to quit: 2014     Years since quittin.7    Smokeless tobacco: Never Used   Substance and Sexual Activity    Alcohol use: No     Alcohol/week: 33.6 oz     Types: 56 Cans of beer per week     Comment: former alcoholic     Drug use: No    Sexual activity: Yes     Partners: Male     Birth control/protection: Pill     Comment: OCP   Lifestyle    Physical activity: Days per week: Not on file     Minutes per session: Not on file    Stress: Not on file   Relationships    Social connections:     Talks on phone: Not on file     Gets together: Not on file     Attends Holiness service: Not on file     Active member of club or organization: Not on file     Attends meetings of clubs or organizations: Not on file     Relationship status: Not on file    Intimate partner violence:     Fear of current or ex partner: Not on file     Emotionally abused: Not on file     Physically abused: Not on file     Forced sexual activity: Not on file   Other Topics Concern    Not on file   Social History Narrative    Not on file     Family History   Problem Relation Age of Onset    Heart Disease Father     High Blood Pressure Father     Other Father         pancreatitis    Diabetes Maternal Grandmother     Diabetes Maternal Grandfather     Cancer Mother         [place] No Known Problems Paternal Grandfather     No Known Problems Paternal Grandmother     No Known Problems Brother     No Known Problems Sister     No Known Problems Other     Breast Cancer Neg Hx     Colon Cancer Neg Hx     Eclampsia Neg Hx     Hypertension Neg Hx     Ovarian Cancer Neg Hx      Labor Neg Hx     Spont Abortions Neg Hx     Stroke Neg Hx      Allergies:  Patient has no known allergies. Review of Systems    Objective:    Pulse 53   Temp 98.4 °F (36.9 °C) (Temporal)   Ht 5' 3\" (1.6 m)   Wt 138 lb (62.6 kg)   LMP  (LMP Unknown)   SpO2 98%   BMI 24.45 kg/m²     Physical Exam  On exam her fistulotomy site has healed completely. There is no evidence of recurrence. There are no signs of infection. She has good sphincter tone on Valsalva. Assessment/Plan:          Diagnosis Orders   1. Anal fistula          I discussed the potential mild defecation changes that occur after a subcutaneous anal fistulotomy.   We discussed the role of the internal as well as external sphincter for continence. There will be a natural decrease in the anal sphincter tone on fistulotomy but this can be overcome with some pelvic floor training. We discussed some maneuvers to assist with that. She is quite active. I will wait to see how she does with those strengthening activities. I am always happy to see her if any further problems or questions arise. Please note this report has beenpartially produced using speech recognition software and may cause contain errors related to that system including grammar, punctuation and spelling as well as words and phrases that may seem inappropriate.  If there arequestions or concerns please feel free to contact me to clarify

## 2019-11-04 RX ORDER — DROSPIRENONE, ETHINYL ESTRADIOL AND LEVOMEFOLATE CALCIUM AND LEVOMEFOLATE CALCIUM 3-0.02(24)
KIT ORAL
Qty: 84 TABLET | Refills: 0 | Status: SHIPPED | OUTPATIENT
Start: 2019-11-04 | End: 2019-12-03 | Stop reason: SDUPTHER

## 2019-12-03 ENCOUNTER — OFFICE VISIT (OUTPATIENT)
Dept: OBGYN CLINIC | Age: 44
End: 2019-12-03
Payer: COMMERCIAL

## 2019-12-03 VITALS
WEIGHT: 135 LBS | BODY MASS INDEX: 23.05 KG/M2 | DIASTOLIC BLOOD PRESSURE: 68 MMHG | SYSTOLIC BLOOD PRESSURE: 118 MMHG | HEIGHT: 64 IN

## 2019-12-03 DIAGNOSIS — Z11.51 SCREENING FOR HUMAN PAPILLOMAVIRUS: ICD-10-CM

## 2019-12-03 DIAGNOSIS — Z12.31 SCREENING MAMMOGRAM, ENCOUNTER FOR: ICD-10-CM

## 2019-12-03 DIAGNOSIS — Z01.419 PAP SMEAR, AS PART OF ROUTINE GYNECOLOGICAL EXAMINATION: Primary | ICD-10-CM

## 2019-12-03 PROCEDURE — 99396 PREV VISIT EST AGE 40-64: CPT | Performed by: OBSTETRICS & GYNECOLOGY

## 2019-12-03 RX ORDER — DROSPIRENONE, ETHINYL ESTRADIOL AND LEVOMEFOLATE CALCIUM AND LEVOMEFOLATE CALCIUM 3-0.02(24)
KIT ORAL
Qty: 84 TABLET | Refills: 3 | Status: SHIPPED | OUTPATIENT
Start: 2019-12-03 | End: 2020-12-09 | Stop reason: SDUPTHER

## 2019-12-03 ASSESSMENT — ENCOUNTER SYMPTOMS
ABDOMINAL DISTENTION: 0
ALLERGIC/IMMUNOLOGIC NEGATIVE: 1
BLOOD IN STOOL: 0
ABDOMINAL PAIN: 0
NAUSEA: 0
CONSTIPATION: 0
ANAL BLEEDING: 0
VOMITING: 0
RESPIRATORY NEGATIVE: 1
RECTAL PAIN: 0
DIARRHEA: 0
EYES NEGATIVE: 1

## 2019-12-11 DIAGNOSIS — Z01.419 PAP SMEAR, AS PART OF ROUTINE GYNECOLOGICAL EXAMINATION: ICD-10-CM

## 2019-12-11 DIAGNOSIS — Z11.51 SCREENING FOR HUMAN PAPILLOMAVIRUS: ICD-10-CM

## 2019-12-18 ENCOUNTER — TELEPHONE (OUTPATIENT)
Dept: OBGYN CLINIC | Age: 44
End: 2019-12-18

## 2020-04-17 ENCOUNTER — OFFICE VISIT (OUTPATIENT)
Dept: SURGERY | Age: 45
End: 2020-04-17
Payer: COMMERCIAL

## 2020-04-17 VITALS
WEIGHT: 138 LBS | SYSTOLIC BLOOD PRESSURE: 116 MMHG | BODY MASS INDEX: 24.45 KG/M2 | DIASTOLIC BLOOD PRESSURE: 82 MMHG | HEIGHT: 63 IN | TEMPERATURE: 97.6 F

## 2020-04-17 PROCEDURE — 46221 LIGATION OF HEMORRHOID(S): CPT | Performed by: COLON & RECTAL SURGERY

## 2020-04-17 PROCEDURE — 99213 OFFICE O/P EST LOW 20 MIN: CPT | Performed by: COLON & RECTAL SURGERY

## 2020-04-17 ASSESSMENT — ENCOUNTER SYMPTOMS
RECTAL PAIN: 1
NAUSEA: 0
DIARRHEA: 0
ABDOMINAL DISTENTION: 0
BLOOD IN STOOL: 0
SHORTNESS OF BREATH: 0

## 2020-04-17 NOTE — PROGRESS NOTES
(Temporal)   Ht 5' 3\" (1.6 m)   Wt 138 lb (62.6 kg)   BMI 24.45 kg/m²     Physical Exam  Constitutional:       Appearance: She is well-developed. HENT:      Head: Normocephalic and atraumatic. Eyes:      Pupils: Pupils are equal, round, and reactive to light. Neck:      Musculoskeletal: Normal range of motion and neck supple. Cardiovascular:      Rate and Rhythm: Normal rate and regular rhythm. Heart sounds: Normal heart sounds. Pulmonary:      Effort: Pulmonary effort is normal. No respiratory distress. Breath sounds: Normal breath sounds. No wheezing. Abdominal:      General: There is no distension. Palpations: There is no mass. Tenderness: There is no abdominal tenderness. There is no guarding or rebound. Genitourinary:     Comments: On anal inspection the previous fistulotomy site left anterior has healed. No evidence of any infection. No signs of recurrent fistula. No skin changes present. On anoscopy she has a grade 2 internal hemorrhoid right posterior portion of the anal canal.  No other abnormality seen on full anoscopy. Musculoskeletal: Normal range of motion. Skin:     General: Skin is warm and dry. Coloration: Skin is not pale. Findings: No erythema or rash. Neurological:      Mental Status: She is alert and oriented to person, place, and time. Psychiatric:         Behavior: Behavior normal.         Judgment: Judgment normal.       Procedure: I discussed with the patient the risks and benefits of hemorrhoid banding. Risks of infection bleeding and recurrence of hemorrhoids were all addressed. Postoperative pain post-banding was discussed as well. I discussed the risks and benefits, and the patient wishes to proceed. Proper consents were obtained. With the patient in left lateral position a lubricated anoscope was inserted without difficulty.   The above-mentioned hemorrhoids were located in the Right posterior after the hemorrhoids were

## 2020-12-07 RX ORDER — DROSPIRENONE, ETHINYL ESTRADIOL AND LEVOMEFOLATE CALCIUM AND LEVOMEFOLATE CALCIUM 3-0.02(24)
KIT ORAL
Qty: 84 TABLET | Refills: 3 | OUTPATIENT
Start: 2020-12-07

## 2020-12-09 ENCOUNTER — OFFICE VISIT (OUTPATIENT)
Dept: OBGYN CLINIC | Age: 45
End: 2020-12-09
Payer: COMMERCIAL

## 2020-12-09 VITALS
BODY MASS INDEX: 22.2 KG/M2 | WEIGHT: 130 LBS | HEIGHT: 64 IN | DIASTOLIC BLOOD PRESSURE: 78 MMHG | SYSTOLIC BLOOD PRESSURE: 118 MMHG

## 2020-12-09 PROCEDURE — 99396 PREV VISIT EST AGE 40-64: CPT | Performed by: OBSTETRICS & GYNECOLOGY

## 2020-12-09 RX ORDER — DROSPIRENONE, ETHINYL ESTRADIOL AND LEVOMEFOLATE CALCIUM AND LEVOMEFOLATE CALCIUM 3-0.02(24)
KIT ORAL
Qty: 84 TABLET | Refills: 3 | Status: SHIPPED | OUTPATIENT
Start: 2020-12-09 | End: 2020-12-09 | Stop reason: CLARIF

## 2020-12-09 ASSESSMENT — ENCOUNTER SYMPTOMS
DIARRHEA: 0
CONSTIPATION: 0
RECTAL PAIN: 0
NAUSEA: 0
ABDOMINAL PAIN: 0
EYES NEGATIVE: 1
ALLERGIC/IMMUNOLOGIC NEGATIVE: 1
VOMITING: 0
ANAL BLEEDING: 0
RESPIRATORY NEGATIVE: 1
BLOOD IN STOOL: 0
ABDOMINAL DISTENTION: 0

## 2020-12-09 ASSESSMENT — PATIENT HEALTH QUESTIONNAIRE - PHQ9
1. LITTLE INTEREST OR PLEASURE IN DOING THINGS: 0
SUM OF ALL RESPONSES TO PHQ9 QUESTIONS 1 & 2: 0
SUM OF ALL RESPONSES TO PHQ QUESTIONS 1-9: 0
2. FEELING DOWN, DEPRESSED OR HOPELESS: 0
SUM OF ALL RESPONSES TO PHQ QUESTIONS 1-9: 0
SUM OF ALL RESPONSES TO PHQ QUESTIONS 1-9: 0

## 2020-12-09 ASSESSMENT — VISUAL ACUITY: OU: 1

## 2020-12-09 NOTE — PROGRESS NOTES
Subjective:      Patient ID: Montrell Swanson is a 50 y.o. female    Annual exam.  No GYN complaints. Normal cycles. Pap performed and screening mammogram ordered. STD screening offered. Monthly SBE encouraged. F/U annual or prn. Vitals:  /78   Ht 5' 3.5\" (1.613 m)   Wt 130 lb (59 kg)   LMP 11/14/2020   BMI 22.67 kg/m²   Past Medical History:   Diagnosis Date    Anxiety disorder     DDD (degenerative disc disease), lumbar     Fistula     Anal     HPV in female     Peripheral neuropathy     alcohol induced    Radiculopathy     Recovering alcoholic ([facility] [address]      Past Surgical History:   Procedure Laterality Date    COLONOSCOPY      OVAL / ROUND WINDOW FISTULA      RV repair    SC I&D RECTAL SUBMUCOSAL ABSCESS N/A 9/12/2018    RECTAL ABSCESS INCISION AND DRAINAGE, PAT ON ADMIT performed by Manpreet Lin MD at [facility]:  Patient has no known allergies. Current Outpatient Medications   Medication Sig Dispense Refill    Drospiren-Eth Estrad-Levomefol 3-0.02-0.451 MG TABS TAKE 1 TABLET DAILY 84 tablet 3    vitamin D (CHOLECALCIFEROL) 1000 UNIT TABS tablet Take 1,000 Units by mouth daily      vitamin C (ASCORBIC ACID) 500 MG tablet Take 500 mg by mouth daily      Glucosamine-Chondroitin (GLUCOSAMINE CHONDR COMPLEX PO) Take by mouth      folic acid (FOLVITE) 1 MG tablet       vitamin B-1 (THIAMINE) 100 MG tablet   2     No current facility-administered medications for this visit.       Social History     Socioeconomic History    Marital status:      Spouse name: Not on file    Number of children: Not on file    Years of education: Not on file    Highest education level: Not on file   Occupational History    Not on file   Social Needs    Financial resource strain: Not on file    Food insecurity     Worry: Not on file     Inability: Not on file    Transportation needs     Medical: Not on file     Non-medical: Not on file   Tobacco Use    Smoking status: Former Smoker     Packs/day: 0.25     Start date: 1990     Last attempt to quit: 2014     Years since quittin.2    Smokeless tobacco: Never Used   Substance and Sexual Activity    Alcohol use: No     Alcohol/week: 56.0 standard drinks     Types: 56 Cans of beer per week     Comment: former alcoholic     Drug use: No    Sexual activity: Yes     Partners: Male     Birth control/protection: Pill     Comment: OCP   Lifestyle    Physical activity     Days per week: Not on file     Minutes per session: Not on file    Stress: Not on file   Relationships    Social connections     Talks on phone: Not on file     Gets together: Not on file     Attends Christianity service: Not on file     Active member of club or organization: Not on file     Attends meetings of clubs or organizations: Not on file     Relationship status: Not on file    Intimate partner violence     Fear of current or ex partner: Not on file     Emotionally abused: Not on file     Physically abused: Not on file     Forced sexual activity: Not on file   Other Topics Concern    Not on file   Social History Narrative    Not on file     Family History   Problem Relation Age of Onset    Heart Disease Father     High Blood Pressure Father     Other Father         pancreatitis    Diabetes Maternal Grandmother     Diabetes Maternal Grandfather     Cancer Mother         Virgin Johnson No Known Problems Paternal Grandfather     No Known Problems Paternal Grandmother     No Known Problems Brother     No Known Problems Sister     No Known Problems Other     Breast Cancer Neg Hx     Colon Cancer Neg Hx     Eclampsia Neg Hx     Hypertension Neg Hx     Ovarian Cancer Neg Hx      Labor Neg Hx     Spont Abortions Neg Hx     Stroke Neg Hx        Review of Systems   Constitutional: Negative. Negative for activity change, appetite change, chills, diaphoresis, fatigue, fever and unexpected weight change. HENT: Negative. Eyes: Negative. Respiratory: Negative. Cardiovascular: Negative. Gastrointestinal: Negative for abdominal distention, abdominal pain, anal bleeding, blood in stool, constipation, diarrhea, nausea, rectal pain and vomiting. Endocrine: Negative. Genitourinary: Negative for decreased urine volume, difficulty urinating, dyspareunia, dysuria, enuresis, flank pain, frequency, genital sores, hematuria, menstrual problem, pelvic pain, urgency, vaginal bleeding, vaginal discharge and vaginal pain. Musculoskeletal: Negative. Skin: Negative. Allergic/Immunologic: Negative. Neurological: Negative. Hematological: Negative. Psychiatric/Behavioral: Negative. Objective:     Physical Exam  Constitutional:       Appearance: She is well-developed. HENT:      Head: Normocephalic. Eyes:      General: Lids are normal. Vision grossly intact. Neck:      Musculoskeletal: Normal range of motion and neck supple. Thyroid: No thyromegaly. Cardiovascular:      Rate and Rhythm: Normal rate and regular rhythm. Heart sounds: Normal heart sounds. Pulmonary:      Effort: Pulmonary effort is normal. No respiratory distress. Breath sounds: Normal breath sounds. No wheezing or rales. Chest:      Chest wall: No tenderness. Breasts:         Right: Normal. No swelling, bleeding, inverted nipple, mass, nipple discharge, skin change or tenderness. Left: Normal. No swelling, bleeding, inverted nipple, mass, nipple discharge, skin change or tenderness. Abdominal:      General: There is no distension. Palpations: Abdomen is soft. There is no mass. Tenderness: There is no abdominal tenderness. There is no guarding or rebound. Hernia: No hernia is present. There is no hernia in the left inguinal area or right inguinal area. Genitourinary:     General: Normal vulva. Pubic Area: No rash. Labia:         Right: No rash, tenderness, lesion or injury.          Left: No rash, Collection Type     Answer: Thin Prep     Order Specific Question:   Prior Abnormal Pap Test     Answer:   No     Order Specific Question:   Screening or Diagnostic     Answer:   Screening     Order Specific Question:   HPV Requested? Answer:   Yes     Order Specific Question:   High Risk Patient     Answer:   N/A         Follow up:  Return in about 1 year (around 12/9/2021) for Annual.   Repeat Annual GYN exam every 1 year. Cervical Cytology exam starts age 24. If Negative Cytology;  follow-up screening per current guidelines. Mammograms yearly starting at age 36. Calcium and Vitamin D dosing reviewed ( age appropriate ). Colonoscopy and bone density screening discussed ( age appropriate ). Birth control and STD prevention discussed ( age appropriate ). Gardisil counseling completed for all patients 7-33 yo. Routine health maintenance ( per PCP and guidelines ). The patient was asked if she would like a chaperone present for her intimate exam. She  Declined the chaperone.  Ruth Ellison

## 2020-12-14 ENCOUNTER — TELEPHONE (OUTPATIENT)
Dept: OBGYN CLINIC | Age: 45
End: 2020-12-14

## 2020-12-14 NOTE — TELEPHONE ENCOUNTER
Pt stated she quit smoking over the weekend, and wanted to know how soon can she be put back on her [facility]. She stated that her [facility] is more important then smoking.

## 2020-12-14 NOTE — TELEPHONE ENCOUNTER
LMOM for pt to c/b to make aware that there should be an extended time before she restarts birth control, at least 6-12 months

## 2020-12-18 DIAGNOSIS — Z01.419 WOMEN'S ANNUAL ROUTINE GYNECOLOGICAL EXAMINATION: ICD-10-CM

## 2020-12-18 DIAGNOSIS — Z11.51 SCREENING FOR HUMAN PAPILLOMAVIRUS: ICD-10-CM

## 2020-12-18 NOTE — LETTER
DORIE RAMOS [facility]  [address] [facility] [address]  Phone: [phone]  Fax: [phone]    Julianna Rey MD        December 21, 2020    [address]  [address] [address]      Dear Tori Davis: The results of your most recent Pap smear are normal. This means that no cancerous or precancerous cells were seen. We recommend that you come back in 1 year for your next routine Pap smear. If you have any questions or concerns, please don't hesitate to call.     Sincerely,        Julianna Rey MD

## 2021-06-10 ENCOUNTER — TELEPHONE (OUTPATIENT)
Dept: FAMILY MEDICINE CLINIC | Age: 46
End: 2021-06-10

## 2021-08-17 ENCOUNTER — TELEPHONE (OUTPATIENT)
Dept: OBGYN CLINIC | Age: 46
End: 2021-08-17

## 2021-08-17 NOTE — TELEPHONE ENCOUNTER
Pt wanted to know if she could be put back on her birth control, she stated she has stopped smoking for 6 months.

## 2021-12-14 ENCOUNTER — OFFICE VISIT (OUTPATIENT)
Dept: OBGYN CLINIC | Age: 46
End: 2021-12-14
Payer: COMMERCIAL

## 2021-12-14 VITALS
DIASTOLIC BLOOD PRESSURE: 84 MMHG | HEIGHT: 64 IN | WEIGHT: 125 LBS | SYSTOLIC BLOOD PRESSURE: 116 MMHG | BODY MASS INDEX: 21.34 KG/M2

## 2021-12-14 DIAGNOSIS — Z12.31 SCREENING MAMMOGRAM, ENCOUNTER FOR: ICD-10-CM

## 2021-12-14 DIAGNOSIS — N95.1 PERIMENOPAUSE: ICD-10-CM

## 2021-12-14 DIAGNOSIS — Z11.51 SCREENING FOR HUMAN PAPILLOMAVIRUS: ICD-10-CM

## 2021-12-14 DIAGNOSIS — Z01.419 WOMEN'S ANNUAL ROUTINE GYNECOLOGICAL EXAMINATION: Primary | ICD-10-CM

## 2021-12-14 DIAGNOSIS — N91.5 OLIGOMENORRHEA, UNSPECIFIED TYPE: ICD-10-CM

## 2021-12-14 PROCEDURE — 99396 PREV VISIT EST AGE 40-64: CPT | Performed by: OBSTETRICS & GYNECOLOGY

## 2021-12-14 PROCEDURE — 99212 OFFICE O/P EST SF 10 MIN: CPT | Performed by: OBSTETRICS & GYNECOLOGY

## 2021-12-14 RX ORDER — TURMERIC 400 MG
CAPSULE ORAL
COMMUNITY
Start: 2021-08-02

## 2021-12-14 ASSESSMENT — PATIENT HEALTH QUESTIONNAIRE - PHQ9
1. LITTLE INTEREST OR PLEASURE IN DOING THINGS: 0
SUM OF ALL RESPONSES TO PHQ QUESTIONS 1-9: 0
2. FEELING DOWN, DEPRESSED OR HOPELESS: 0
SUM OF ALL RESPONSES TO PHQ9 QUESTIONS 1 & 2: 0
SUM OF ALL RESPONSES TO PHQ QUESTIONS 1-9: 0
SUM OF ALL RESPONSES TO PHQ QUESTIONS 1-9: 0

## 2021-12-14 ASSESSMENT — ENCOUNTER SYMPTOMS
CONSTIPATION: 0
EYES NEGATIVE: 1
RECTAL PAIN: 0
ALLERGIC/IMMUNOLOGIC NEGATIVE: 1
BLOOD IN STOOL: 0
ANAL BLEEDING: 0
RESPIRATORY NEGATIVE: 1
ABDOMINAL DISTENTION: 0
ABDOMINAL PAIN: 0
VOMITING: 0
DIARRHEA: 0
NAUSEA: 0

## 2021-12-14 ASSESSMENT — VISUAL ACUITY: OU: 1

## 2021-12-14 NOTE — PATIENT INSTRUCTIONS
Patient Education        Learning About Birth Control: Intrauterine Device (IUD)  What is an intrauterine device (IUD)? The intrauterine device (IUD) is used to prevent pregnancy. It's a small, plastic, T-shaped device. Your doctor places the IUD in your uterus. If you and your doctor discuss it before you give birth, this can be done right after you have your baby. You have a choice between a hormonal IUD and a copper IUD. The hormonal IUD can prevent pregnancy for 3 to 6 years, depending on which IUD is used. But your doctor may talk to you about leaving it in for longer. When you have it, you don't have to do anything else to prevent pregnancy. The copper IUD can prevent pregnancy for 10 years. But your doctor may talk to you about leaving it in for longer. When you have it, you don't have to do anything else to prevent pregnancy. A string tied to the end of the IUD hangs down through the opening of the uterus (called the cervix) into the vagina. You can check that the IUD is in place by feeling for the string. The IUD usually stays in the uterus until your doctor removes it. How well does an IUD for birth control work? In the first year of use:  · When the hormonal IUD is used exactly as directed, fewer than 1 out of 100 women have an unplanned pregnancy. · When the copper IUD is used exactly as directed, fewer than 1 out of 100 women have an unplanned pregnancy. Be sure to tell your doctor about any health problems you have or medicines you take. Your doctor can help you choose the birth control method that's right for you. What are the advantages of an IUD? · An IUD is one of the most effective methods of birth control. · It prevents pregnancy for 3 to 10 years, depending on the type. You don't have to worry about birth control during this time. · It's safe to use while breastfeeding. · IUDs don't contain estrogen.  So you can use an IUD if you don't want to take estrogen or can't take estrogen because you have certain health problems or concerns. · An IUD is convenient. It is always providing birth control. You don't need to remember to take a pill or get a shot. You don't have to interrupt sex to protect against pregnancy. · A hormonal IUD may reduce heavy bleeding and cramping. What are the disadvantages of an IUD? · An IUD doesn't protect against sexually transmitted infections (STIs), such as herpes or HIV/AIDS. If you aren't sure if your sex partner might have an STI, use a condom to protect against disease. · A copper IUD may cause periods with more bleeding and cramping. · You have to see a doctor to have an IUD inserted and removed. Where can you learn more? Go to https://alive.cn.healthGlobant. org and sign in to your [employer] account. Enter D275 in the Predict[employer] box to learn more about \"Learning About Birth Control: Intrauterine Device (IUD). \"     If you do not have an account, please click on the \"Sign Up Now\" link. Current as of: June 16, 2021               Content Version: 13.0  © 1889-4072 [employer]. Care instructions adapted under license by [facility] ([facility]). If you have questions about a medical condition or this instruction, always ask your healthcare professional. [address] any warranty or liability for your use of this information. Patient Education        levonorgestrel intrauterine system  Pronunciation:  LEONARD bhakta IN tra UE ter ine SIS tem  Brand:  Darci Joseph  What is the most important information I should know about levonorgestrel intrauterine system? You should not use this device if you have abnormal vaginal bleeding, a pelvic infection, certain other problems with your uterus or cervix, or if you have breast or uterine cancer, liver disease or liver tumor, or a weak immune system. Do not use during pregnancy. Call your doctor if you think you might be pregnant.   What is levonorgestrel intrauterine system? Levonorgestrel is a female hormone that can cause changes in your cervix and uterus. Levonorgestrel intrauterine system is a T-shaped plastic intrauterine device (IUD) that is placed in the uterus where it slowly releases the hormone. Levonorgestrel intrauterine system used to prevent pregnancy for 3 to 6 years. You may use this IUD whether you have children or not. Mirena is also used to treat heavy menstrual bleeding in women who choose to use an intrauterine form of birth control. Levonorgestrel is a progestin and does not contain estrogen. Levonorgestrel intrauterine system should not be used as emergency birth control. Levonorgestrel intrauterine system may also be used for purposes not listed in this medication guide. What should I discuss with my healthcare provider before using levonorgestrel intrauterine system? An IUD can increase your risk of developing a serious pelvic infection, which may threaten your life or your future ability to have children. Ask your doctor about your personal risk. Do not use during pregnancy. This IUD can cause severe infection, miscarriage, premature birth, or death of the mother if left in place during pregnancy. Tell your doctor right away if you become pregnant. If you continue a pregnancy that occurs while using this IUD, watch for signs such as fever, chills, cramps, vaginal bleeding or discharge.   You should not use this device if you are allergic to levonorgestrel, silicone, silica, silver, barium, iron oxide, or polyethylene, or if you have:  · abnormal vaginal bleeding that has not been checked by a doctor;  · an untreated or uncontrolled pelvic infection (vaginal, cervical, uterine);  · endometriosis or a serious pelvic infection following a pregnancy or  in the past 3 months;  · pelvic inflammatory disease (PID), unless you had a normal pregnancy after the infection was treated and cleared;  · uterine fibroid tumors or conditions that affect the shape of the uterus;  · past or present cancer of the breast, cervix, or uterus;  · liver disease or liver tumor (benign or malignant);  · a condition that weakens your immune system, such as AIDS, leukemia, or IV drug abuse; or  · if you have another intrauterine device (IUD) in place. Tell your doctor if you have ever had:  · high blood pressure, heart problems, a heart valve disorder, a heart attack or stroke;  · bleeding problems;  · migraine headaches; or  · a vaginal infection, pelvic infection, or sexually transmitted disease. You should not use this IUD if you are breastfeeding a baby younger than 7 weeks old. This IUD may be more likely to form a hole or get embedded in the wall of your uterus if you have the device inserted while you are breastfeeding. How is levonorgestrel intrauterine system used? The levonorgestrel IUD is inserted through the vagina and placed into the uterus by a doctor. The device is usually inserted within 7 days after the start of a menstrual period. You may feel pain or dizziness during insertion of the IUD. You may also have minor vaginal bleeding. Tell your doctor if you still have these symptoms longer than 30 minutes. The levonorgestrel device should not interfere with sexual intercourse, wearing tampons, or using other vaginal medications. Your doctor will need to see you within a few weeks after insertion of the device to make sure it is still in place correctly. You will also need regular annual pelvic exams and Pap smears. You may have irregular periods during the first 3 to 6 months of use. Your flow may be lighter or heavier, and you may eventually stop having periods after several months. Tell your doctor if you do not have a period for 6 weeks or if you think you might be pregnant. The IUD may come out by itself. After each menstrual period, make sure you can still feel the removal strings.  Wash your hands with soap and water, and insert your clean fingers into the vagina. You should be able to feel the strings at the opening of your cervix. Call your doctor at once if you cannot feel the strings, or if you think the IUD has slipped lower or has come out of your uterus, especially if you also have pain or bleeding. Use a non-hormone method of birth control (condom, diaphragm, cervical cap, or contraceptive sponge) to prevent pregnancy until your doctor is able to replace the IUD. If you need to have an MRI (magnetic resonance imaging), tell your caregivers ahead of time that you have an IUD in place. Your device may be removed at any time you decide to stop using birth control. The Mirena IUD must be removed at the end of the 6-year wearing time. Roselinda Prior must be removed after 5 years, and Zeinab or [place] must be removed after 3 years. Your doctor can insert a new device at that time if you wish to continue using this form of birth control. Only your doctor should remove the IUD. Do not attempt to remove the device yourself. If you wish to continue preventing pregnancy, you may need to start using another birth control method a week before your levonorgestrel intrauterine system is removed. What happens if I miss a dose? Since the IUD continuously releases a low dose of levonorgestrel, missing a dose does not occur when using this form of levonorgestrel. What happens if I overdose? An overdose of levonorgestrel released from the intrauterine system is very unlikely to occur. What should I avoid while using levonorgestrel intrauterine system? Avoid having more than one sex partner. The IUD can increase your risk of developing a serious pelvic infection, which is often caused by sexually transmitted disease. Levonorgestrel intrauterine system will not protect you from sexually transmitted diseases, including HIV and AIDS. Using a condom is the only way to help protect yourself from these diseases.   Call your doctor if your sex partner develops HIV or a sexually transmitted disease, or if you have any change in sexual relationships. What are the possible side effects of levonorgestrel intrauterine system? Get emergency medical help if you have signs of an allergic reaction: hives; difficult breathing; swelling of your face, lips, tongue, or throat. Get emergency medical help if you have severe pain in your lower stomach or side. This could be a sign of a tubal pregnancy (a pregnancy that implants in the fallopian tube instead of the uterus). A tubal pregnancy is a medical emergency. The levonorgestrel IUD may become embedded into the wall of the uterus, or may perforate (form a hole) in the uterus. If this occurs, the device may no longer prevent pregnancy, or it may move outside the uterus and cause scarring, infection, or damage to other organs. Your doctor may need to surgically remove the device. Call your doctor at once if you have:  · severe cramps or pelvic pain, pain during sexual intercourse;  · extreme dizziness or light-headed feeling;  · severe migraine headache;  · heavy or ongoing vaginal bleeding, vaginal sores, vaginal discharge that is watery, foul-smelling discharge, or otherwise unusual;  · pale skin, weakness, easy bruising or bleeding, fever, chills, or other signs of infection;  · jaundice (yellowing of the skin or eyes); or  · sudden numbness or weakness (especially on one side of the body), confusion, problems with vision, sensitivity to light.   Common side effects may include:  · pelvic pain, vaginal itching or infection, missed or irregular menstrual periods, changes in bleeding patterns or flow (especially during the first 3 to 6 months);  · temporary pain, bleeding, or dizziness during insertion of the IUD;  · ovarian cysts (pelvic pain that disappears within 3 months);  · stomach pain, nausea, vomiting, bloating;  · headache, migraine, depression, mood changes;  · back pain, breast tenderness or pain;  · weight gain, acne, changes in hair growth, loss of interest in sex; or  · puffiness in your face, hands, ankles, or feet. This is not a complete list of side effects and others may occur. Call your doctor for medical advice about side effects. You may report side effects to FDA at 7-919-FDA-8682. What other drugs will affect levonorgestrel intrauterine system? Some drugs can affect your blood levels of levonorgestrel, which could make this form of birth control less effective. Tell your doctor about all your other medicines, including prescription and over-the-counter medicines, vitamins, and herbal products. Tell your doctor about all your current medicines and any medicine you start or stop using. Where can I get more information? Your doctor or pharmacist can provide more information about the levonorgestrel intrauterine system. Remember, keep this and all other medicines out of the reach of children, never share your medicines with others, and use this medication only for the indication prescribed. Every effort has been made to ensure that the information provided by [address] is accurate, up-to-date, and complete, but no guarantee is made to that effect. Drug information contained herein may be time sensitive. [facility] information has been compiled for use by healthcare practitioners and consumers in the [facility] and therefore [facility] does not warrant that uses outside of the [facility] are appropriate, unless specifically indicated otherwise. [facility]'s drug information does not endorse drugs, diagnose patients or recommend therapy. [facility] drug information is an informational resource designed to assist licensed healthcare practitioners in caring for their patients and/or to serve consumers viewing this service as a supplement to, and not a substitute for, the expertise, skill, knowledge and judgment of healthcare practitioners.  The absence of a warning for a given drug or drug combination in no way should be construed to indicate that the drug or drug combination is safe, effective or appropriate for any given patient. [facility] does not assume any responsibility for any aspect of healthcare administered with the aid of information [facility] provides. The information contained herein is not intended to cover all possible uses, directions, precautions, warnings, drug interactions, allergic reactions, or adverse effects. If you have questions about the drugs you are taking, check with your doctor, nurse or pharmacist.  Copyright 6902-7930 [address]. Version: 8.01. Revision date: 12/9/2020. Care instructions adapted under license by [facility] ([facility]). If you have questions about a medical condition or this instruction, always ask your healthcare professional. [place] [address] any warranty or liability for your use of this information. Patient Education        Intrauterine Device (IUD) Insertion: Care Instructions  Your Care Instructions     An intrauterine device (IUD) is a very effective method of birth control. It is a small, plastic, T-shaped device that contains copper or hormones. The doctor inserts the IUD into your uterus. You can have an IUD inserted at any time, as long as you aren't pregnant and you don't have a pelvic infection. If you and your doctor discuss it before you give birth, this can be done right after you have your baby. A plastic string tied to the end of the IUD hangs down through the cervix into the vagina. Your doctor may have you feel for the IUD string right after insertion, to be sure you know what it feels like. There are two types of IUDs. The copper IUD works for up to 10 years. The hormonal IUD works for either 3 years or 6 years, depending on which IUD is used. But your doctor may talk to you about leaving it in for longer. The hormonal IUD also reduces menstrual bleeding and cramping.   Follow-up care is a key part of your treatment and safety. Be sure to make and go to all appointments, and call your doctor if you are having problems. It's also a good idea to know your test results and keep a list of the medicines you take. How can you care for yourself at home? · It's safe to use while breastfeeding. · You may experience some mild cramping and light bleeding (spotting) for 1 or 2 days. Use a hot water bottle or a heating pad set on low on your belly for pain. · Take an over-the-counter pain medicine, such as acetaminophen (Tylenol), ibuprofen (Advil, Motrin), and naproxen (Aleve) if needed. Read and follow all instructions on the label. · Do not take two or more pain medicines at the same time unless the doctor told you to. Many pain medicines have acetaminophen, which is Tylenol. Too much acetaminophen (Tylenol) can be harmful. · If you want to check the string of your IUD, insert a finger into your vagina and feel for the cervix, which is at the top of the vagina and feels harder than the rest of your vagina. You should be able to feel the thin, plastic string coming out of the opening of your cervix. If you cannot feel the string, use another form of birth control and make an appointment with your doctor to have the string checked. · If the IUD comes out, save it and call your doctor. Be sure to use another form of birth control while the IUD is out. · Use latex condoms to protect against sexually transmitted infections (STIs), such as gonorrhea and chlamydia. An IUD does not protect you from STIs. Having one sex partner (who does not have STIs and does not have sex with anyone else) is a good way to avoid STIs. When should you call for help? Call 911 anytime you think you may need emergency care. For example, call if:    · You passed out (lost consciousness).     · You have sudden, severe pain in your belly or pelvis.    Call your doctor now or seek immediate medical care if:    · You have new belly or pelvic pain.     · You have severe vaginal bleeding. This means that you are soaking through your usual pads or tampons each hour for 2 or more hours.     · You are dizzy or lightheaded, or you feel like you may faint.     · You have a fever and pelvic pain or vaginal discharge.     · You have pelvic pain that is getting worse. Watch closely for changes in your health, and be sure to contact your doctor if:    · You cannot feel the string, or the IUD comes out.     · You feel sick to your stomach, or you vomit.     · You think you may be pregnant. Where can you learn more? Go to https://chpepiceweb.healthPatients Know [email or web address] org and sign in to your [employer] account. Enter L214 in the RetiDiag box to learn more about \"Intrauterine Device (IUD) Insertion: Care Instructions. \"     If you do not have an account, please click on the \"Sign Up Now\" link. Current as of: June 16, 2021               Content Version: 13.0  © 2006-2021 [employer]. Care instructions adapted under license by [employer]. If you have questions about a medical condition or this instruction, always ask your healthcare professional. [place] [address] any warranty or liability for your use of this information. Patient Education        IUD Removal: Care Instructions  Your Care Instructions     The intrauterine device (IUD) is a method of birth control. It is a small, plastic, T-shaped device that contains copper or hormones. It is placed in your uterus. You may have had your IUD removed because you want to become pregnant. Or maybe it caused pain, bleeding, or an infection. You may have chosen another method of birth control. If you don't want to get pregnant, make sure to use another form of birth control now that your IUD is not in place. Talk to your doctor about other forms of birth control. Follow-up care is a key part of your treatment and safety.  Be sure to make and go to all appointments, and call your doctor if you are having problems. It's also a good idea to know your test results and keep a list of the medicines you take. How can you care for yourself at home? · IUD removal does not usually cause any pain or problems if the IUD is removed because you want to become pregnant or because of bleeding. · Once the IUD is taken out, you can become pregnant. If you want to become pregnant, you can start trying to have a baby as soon as you like. · If your doctor prescribed antibiotics because of an infection, take them as directed. Do not stop taking them just because you feel better. You need to take the full course of antibiotics. When should you call for help? Call your doctor now or seek immediate medical care if:    · You have pain in your belly or pelvis.     · You have severe vaginal bleeding. This means that you are soaking through your usual pads or tampons every hour for 2 or more hours.     · You have a fever.     · You have a vaginal discharge that smells bad. Watch closely for changes in your health, and be sure to contact your doctor if you have any problems. Where can you learn more? Go to https://VC4Africapepiceweb.healthBluetest. org and sign in to your [employer] account. Enter W048 in the [facility] box to learn more about \"IUD Removal: Care Instructions. \"     If you do not have an account, please click on the \"Sign Up Now\" link. Current as of: June 16, 2021               Content Version: 13.0  © 2006-2021 [employer], [facility]. Care instructions adapted under license by [facility] ([facility]). If you have questions about a medical condition or this instruction, always ask your healthcare professional. [place] [address] any warranty or liability for your use of this information.

## 2021-12-14 NOTE — PROGRESS NOTES
Subjective:      Patient ID: Zak Victoria is a 48 y.o. female    Annual exam.   Pap performed and screening mammogram ordered. STD screening offered. Monthly SBE encouraged. F/U annual or prn. Pt also wished to discuss cycles and Mirenaextending her appointment time by 10 minutes. Patient stopped OCP last 1/2021. Was having very light 1 days cycles on OCP. No cycle again until last week. Normal cycle. Brown spotting now. Discussed normal perimenopausal cycles and instructed to keep bleeding calendar. Discussed using reliable contraception until no cycle x 1 year and supportive labs for menopause if < 49 yo. Patient had questions regarding Mirena. Discussed risks and benefits and patient to consider. All questions answered. Vitals:  /84   Ht 5' 3.5\" (1.613 m)   Wt 125 lb (56.7 kg)   LMP 12/12/2021   BMI 21.80 kg/m²   Past Medical History:   Diagnosis Date    Anxiety disorder     DDD (degenerative disc disease), lumbar     Fistula     Anal     HPV in female     Peripheral neuropathy     alcohol induced    Radiculopathy     Recovering alcoholic ([facility] [address]      Past Surgical History:   Procedure Laterality Date    COLONOSCOPY      OVAL / ROUND WINDOW FISTULA      RV repair    SC I&D RECTAL SUBMUCOSAL ABSCESS N/A 9/12/2018    RECTAL ABSCESS INCISION AND DRAINAGE, PAT ON ADMIT performed by Moses Bradford MD at [facility]:  Patient has no known allergies. Current Outpatient Medications   Medication Sig Dispense Refill    Turmeric 400 MG CAPS       vitamin D (CHOLECALCIFEROL) 1000 UNIT TABS tablet Take 1,000 Units by mouth daily      vitamin C (ASCORBIC ACID) 500 MG tablet Take 500 mg by mouth daily      Glucosamine-Chondroitin (GLUCOSAMINE CHONDR COMPLEX PO) Take by mouth      folic acid (FOLVITE) 1 MG tablet       vitamin B-1 (THIAMINE) 100 MG tablet   2     No current facility-administered medications for this visit.      Social History     Socioeconomic History    Marital status:      Spouse name: Not on file    Number of children: Not on file    Years of education: Not on file    Highest education level: Not on file   Occupational History    Not on file   Tobacco Use    Smoking status: Former Smoker     Packs/day: 0.25     Start date: 1990     Quit date: 2014     Years since quittin.3    Smokeless tobacco: Never Used   Substance and Sexual Activity    Alcohol use: No     Alcohol/week: 56.0 standard drinks     Types: 56 Cans of beer per week     Comment: former alcoholic     Drug use: No    Sexual activity: Yes     Partners: Male     Birth control/protection: Pill     Comment: OCP   Other Topics Concern    Not on file   Social History Narrative    Not on file     Social Determinants of Health     Financial Resource Strain:     Difficulty of Paying Living Expenses: Not on file   Food Insecurity:     Worried About Running Out of Food in the Last Year: Not on file    Kellen of Food in the Last Year: Not on file   Transportation Needs:     Lack of Transportation (Medical): Not on file    Lack of Transportation (Non-Medical):  Not on file   Physical Activity:     Days of Exercise per Week: Not on file    Minutes of Exercise per Session: Not on file   Stress:     Feeling of Stress : Not on file   Social Connections:     Frequency of Communication with Friends and Family: Not on file    Frequency of Social Gatherings with Friends and Family: Not on file    Attends Spiritism Services: Not on file    Active Member of Clubs or Organizations: Not on file    Attends Club or Organization Meetings: Not on file    Marital Status: Not on file   Intimate Partner Violence:     Fear of Current or Ex-Partner: Not on file    Emotionally Abused: Not on file    Physically Abused: Not on file    Sexually Abused: Not on file   Housing Stability:     Unable to Pay for Housing in the Last Year: Not on file    Number of [place] in the Last Year: Not on file    Unstable Housing in the Last Year: Not on file     Family History   Problem Relation Age of Onset    Heart Disease Father     High Blood Pressure Father     Other Father         pancreatitis    Diabetes Maternal Grandmother     Diabetes Maternal Grandfather     Cancer Mother         Robyne Shelling No Known Problems Paternal Grandfather     No Known Problems Paternal Grandmother     No Known Problems Brother     No Known Problems Sister     No Known Problems Other     Breast Cancer Neg Hx     Colon Cancer Neg Hx     Eclampsia Neg Hx     Hypertension Neg Hx     Ovarian Cancer Neg Hx      Labor Neg Hx     Spont Abortions Neg Hx     Stroke Neg Hx        Review of Systems   Constitutional: Negative. Negative for activity change, appetite change, chills, diaphoresis, fatigue, fever and unexpected weight change. HENT: Negative. Eyes: Negative. Respiratory: Negative. Cardiovascular: Negative. Gastrointestinal: Negative for abdominal distention, abdominal pain, anal bleeding, blood in stool, constipation, diarrhea, nausea, rectal pain and vomiting. Endocrine: Negative. Genitourinary: Negative for decreased urine volume, difficulty urinating, dyspareunia, dysuria, enuresis, flank pain, frequency, genital sores, hematuria, menstrual problem, pelvic pain, urgency, vaginal bleeding, vaginal discharge and vaginal pain. Musculoskeletal: Negative. Skin: Negative. Allergic/Immunologic: Negative. Neurological: Negative. Hematological: Negative. Psychiatric/Behavioral: Negative. Objective:     Physical Exam  Constitutional:       Appearance: She is well-developed. HENT:      Head: Normocephalic. Eyes:      General: Lids are normal. Vision grossly intact. Neck:      Thyroid: No thyromegaly. Cardiovascular:      Rate and Rhythm: Normal rate and regular rhythm. Heart sounds: Normal heart sounds.    Pulmonary:      Effort: Pulmonary effort is normal. No respiratory distress. Breath sounds: Normal breath sounds. No wheezing or rales. Chest:      Chest wall: No tenderness. Breasts:      Right: Normal. No swelling, bleeding, inverted nipple, mass, nipple discharge, skin change, tenderness, axillary adenopathy or supraclavicular adenopathy. Left: Normal. No swelling, bleeding, inverted nipple, mass, nipple discharge, skin change, tenderness, axillary adenopathy or supraclavicular adenopathy. Abdominal:      General: There is no distension. Palpations: Abdomen is soft. There is no mass. Tenderness: There is no abdominal tenderness. There is no guarding or rebound. Hernia: No hernia is present. There is no hernia in the left inguinal area or right inguinal area. Genitourinary:     General: Normal vulva. Pubic Area: No rash. Labia:         Right: No rash, tenderness, lesion or injury. Left: No rash, tenderness, lesion or injury. Urethra: No prolapse, urethral swelling or urethral lesion. Vagina: Normal. No signs of injury and foreign body. No vaginal discharge, erythema, tenderness or bleeding. Cervix: No cervical motion tenderness, discharge or friability. Uterus: Not deviated, not enlarged, not fixed and not tender. Adnexa:         Right: No mass, tenderness or fullness. Left: No mass, tenderness or fullness. Rectum: Normal.   Musculoskeletal:         General: No tenderness. Normal range of motion. Cervical back: Normal range of motion and neck supple. Lymphadenopathy:      Cervical: No cervical adenopathy. Upper Body:      Right upper body: No supraclavicular or axillary adenopathy. Left upper body: No supraclavicular or axillary adenopathy. Lower Body: No right inguinal adenopathy. No left inguinal adenopathy. Skin:     General: Skin is warm and dry. Coloration: Skin is not pale. Findings: No erythema or rash. Neurological:      Mental Status: She is alert and oriented to person, place, and time. Psychiatric:         Behavior: Behavior normal.         Thought Content: Thought content normal.         Judgment: Judgment normal.         Assessment:      Diagnosis Orders   1. Women's annual routine gynecological examination  PAP SMEAR   2. Screening for human papillomavirus  PAP SMEAR   3. Screening mammogram, encounter for  MADI DIGITAL SCREEN W OR WO CAD BILATERAL   4. Perimenopause     5. Oligomenorrhea, unspecified type           Plan:      Medications placedthis encounter:  No orders of the defined types were placed in this encounter. Orders placedthis encounter:  Orders Placed This Encounter   Procedures    MADI DIGITAL SCREEN W OR WO CAD BILATERAL     Standing Status:   Future     Standing Expiration Date:   12/14/2022    PAP SMEAR     Standing Status:   Future     Standing Expiration Date:   12/14/2022     Order Specific Question:   Collection Type     Answer: Thin Prep     Order Specific Question:   Prior Abnormal Pap Test     Answer:   No     Order Specific Question:   Screening or Diagnostic     Answer:   Screening     Order Specific Question:   HPV Requested? Answer:   Yes     Order Specific Question:   High Risk Patient     Answer:   N/A         Follow up:  Return in about 1 year (around 12/14/2022) for Annual.   Repeat Annual GYN exam every 1 year. Cervical Cytology exam starts age 24. If Negative Cytology;  follow-up screening per current guidelines. Mammograms yearly starting at age 36. Calcium and Vitamin D dosing reviewed ( age appropriate ). Colonoscopy and bone density screening discussed ( age appropriate ). Birth control and STD prevention discussed ( age appropriate ). Gardisil counseling completed for all patients ( age appropriate ). Routine health maintenance ( per PCP and guidelines ).                 The patient was asked if she would like a chaperone present for her intimate exam. She  Declined the chaperone.  Sangeetha Triplett MD

## 2021-12-22 DIAGNOSIS — Z01.419 WOMEN'S ANNUAL ROUTINE GYNECOLOGICAL EXAMINATION: ICD-10-CM

## 2021-12-22 DIAGNOSIS — Z11.51 SCREENING FOR HUMAN PAPILLOMAVIRUS: ICD-10-CM

## 2022-02-22 ENCOUNTER — TELEPHONE (OUTPATIENT)
Dept: OBGYN CLINIC | Age: 47
End: 2022-02-22

## 2022-02-22 NOTE — TELEPHONE ENCOUNTER
Received call from patient stating received a bill for an  added medical office fee besides annual exam.Offered patient contact number for billing  Yet patient stated she did call and was told to contact the office,that Dr. Rick Yeager had to re-code visit,patient would like to receive a call if possible.

## 2022-02-28 NOTE — TELEPHONE ENCOUNTER
Received call from patient following up regarding bill received. Patient requesting a call back from Vinnie W Wilber Pena

## 2022-06-15 ENCOUNTER — OFFICE VISIT (OUTPATIENT)
Dept: OBGYN CLINIC | Age: 47
End: 2022-06-15
Payer: COMMERCIAL

## 2022-06-15 VITALS
SYSTOLIC BLOOD PRESSURE: 108 MMHG | DIASTOLIC BLOOD PRESSURE: 62 MMHG | BODY MASS INDEX: 20.83 KG/M2 | WEIGHT: 122 LBS | HEIGHT: 64 IN

## 2022-06-15 DIAGNOSIS — N89.8 VAGINAL ODOR: ICD-10-CM

## 2022-06-15 DIAGNOSIS — N89.8 VAGINAL DISCHARGE: Primary | ICD-10-CM

## 2022-06-15 PROCEDURE — 99213 OFFICE O/P EST LOW 20 MIN: CPT | Performed by: OBSTETRICS & GYNECOLOGY

## 2022-06-15 ASSESSMENT — ENCOUNTER SYMPTOMS
ANAL BLEEDING: 0
NAUSEA: 0
ABDOMINAL DISTENTION: 0
VOMITING: 0
DIARRHEA: 0
EYES NEGATIVE: 1
ABDOMINAL PAIN: 0
BLOOD IN STOOL: 0
CONSTIPATION: 0
RESPIRATORY NEGATIVE: 1
RECTAL PAIN: 0
ALLERGIC/IMMUNOLOGIC NEGATIVE: 1

## 2022-06-15 ASSESSMENT — PATIENT HEALTH QUESTIONNAIRE - PHQ9
SUM OF ALL RESPONSES TO PHQ9 QUESTIONS 1 & 2: 0
SUM OF ALL RESPONSES TO PHQ QUESTIONS 1-9: 0
SUM OF ALL RESPONSES TO PHQ QUESTIONS 1-9: 0
2. FEELING DOWN, DEPRESSED OR HOPELESS: 0
SUM OF ALL RESPONSES TO PHQ QUESTIONS 1-9: 0
1. LITTLE INTEREST OR PLEASURE IN DOING THINGS: 0
SUM OF ALL RESPONSES TO PHQ QUESTIONS 1-9: 0

## 2022-06-15 NOTE — PROGRESS NOTES
Patient here to discuss medical management for irregular cycles and contraception. Reviewed medical, surgical, social and family history. Also reviewed current medications and allergies. Discussed low dose OCP, Depo Provera, Mirena IUD, Zeinab IUD. Discussed risks and benefits of each method and all questions answered. After discussion, patient opted to start Mirena. Also c/o some increase in vaginal discharge and odor. No itching or burning. New sexual partner. STD screening and wet prep performed. F/U as directed. Vitals:  /62   Ht 5' 3.5\" (1.613 m)   Wt 122 lb (55.3 kg)   LMP 06/03/2022   BMI 21.27 kg/m²   Past Medical History:   Diagnosis Date    Anxiety disorder     DDD (degenerative disc disease), lumbar     Fistula     Anal     HPV in female     Peripheral neuropathy     alcohol induced    Radiculopathy     Recovering alcoholic ([facility] [address]      Past Surgical History:   Procedure Laterality Date    COLONOSCOPY      OVAL / ROUND WINDOW FISTULA      RV repair    MI I&D RECTAL SUBMUCOSAL ABSCESS N/A 9/12/2018    RECTAL ABSCESS INCISION AND DRAINAGE, PAT ON ADMIT performed by Delmy Gibbons MD at [facility]:  Patient has no known allergies. Current Outpatient Medications   Medication Sig Dispense Refill    Turmeric 400 MG CAPS       vitamin D (CHOLECALCIFEROL) 1000 UNIT TABS tablet Take 1,000 Units by mouth daily      vitamin C (ASCORBIC ACID) 500 MG tablet Take 500 mg by mouth daily      Glucosamine-Chondroitin (GLUCOSAMINE CHONDR COMPLEX PO) Take by mouth      folic acid (FOLVITE) 1 MG tablet       vitamin B-1 (THIAMINE) 100 MG tablet   2     No current facility-administered medications for this visit.      Social History     Socioeconomic History    Marital status:      Spouse name: Not on file    Number of children: Not on file    Years of education: Not on file    Highest education level: Not on file   Occupational History    Not on file Tobacco Use    Smoking status: Former Smoker     Packs/day: 0.25     Start date: 1990     Quit date: 2014     Years since quittin.8    Smokeless tobacco: Never Used   Substance and Sexual Activity    Alcohol use: No     Alcohol/week: 56.0 standard drinks     Types: 56 Cans of beer per week     Comment: former alcoholic     Drug use: No    Sexual activity: Yes     Partners: Male     Birth control/protection: Pill     Comment: OCP   Other Topics Concern    Not on file   Social History Narrative    Not on file     Social Determinants of Health     Financial Resource Strain:     Difficulty of Paying Living Expenses: Not on file   Food Insecurity:     Worried About Running Out of Food in the Last Year: Not on file    Kellen of Food in the Last Year: Not on file   Transportation Needs:     Lack of Transportation (Medical): Not on file    Lack of Transportation (Non-Medical):  Not on file   Physical Activity:     Days of Exercise per Week: Not on file    Minutes of Exercise per Session: Not on file   Stress:     Feeling of Stress : Not on file   Social Connections:     Frequency of Communication with Friends and Family: Not on file    Frequency of Social Gatherings with Friends and Family: Not on file    Attends Mu-ism Services: Not on file    Active Member of [address] [place] [address] [employer] or Organizations: Not on file    Attends Club or Organization Meetings: Not on file    Marital Status: Not on file   Intimate Partner Violence:     Fear of Current or Ex-Partner: Not on file    Emotionally Abused: Not on file    Physically Abused: Not on file    Sexually Abused: Not on file   Housing Stability:     Unable to Pay for Housing in the Last Year: Not on file    Number of [place] in the Last Year: Not on file    Unstable Housing in the Last Year: Not on file        Family History   Problem Relation Age of Onset    Heart Disease Father     High Blood Pressure Father     Other Father pancreatitis    Diabetes Maternal Grandmother     Diabetes Maternal Grandfather     Cancer Mother         Holly Minors No Known Problems Paternal Grandfather     No Known Problems Paternal Grandmother     No Known Problems Brother     No Known Problems Sister     No Known Problems Other     Breast Cancer Neg Hx     Colon Cancer Neg Hx     Eclampsia Neg Hx     Hypertension Neg Hx     Ovarian Cancer Neg Hx      Labor Neg Hx     Spont Abortions Neg Hx     Stroke Neg Hx        Review of Systems   Constitutional: Negative. Negative for activity change, appetite change, chills, diaphoresis, fatigue, fever and unexpected weight change. HENT: Negative. Eyes: Negative. Respiratory: Negative. Cardiovascular: Negative. Gastrointestinal: Negative for abdominal distention, abdominal pain, anal bleeding, blood in stool, constipation, diarrhea, nausea, rectal pain and vomiting. Endocrine: Negative. Genitourinary: Positive for vaginal discharge. Negative for decreased urine volume, difficulty urinating, dyspareunia, dysuria, enuresis, flank pain, frequency, genital sores, hematuria, menstrual problem, pelvic pain, urgency, vaginal bleeding and vaginal pain. Musculoskeletal: Negative. Skin: Negative. Allergic/Immunologic: Negative. Neurological: Negative. Hematological: Negative. Psychiatric/Behavioral: Negative. Objective:     Physical Exam  Constitutional:       General: She is not in acute distress. Appearance: She is well-developed. She is not diaphoretic. HENT:      Head: Normocephalic and atraumatic. Eyes:      Conjunctiva/sclera: Conjunctivae normal.   Cardiovascular:      Rate and Rhythm: Normal rate and regular rhythm. Pulmonary:      Effort: Pulmonary effort is normal. No respiratory distress. Genitourinary:     Labia:         Right: No rash, tenderness, lesion or injury. Left: No rash, tenderness, lesion or injury.        Vagina: Normal. No signs of injury and foreign body. No vaginal discharge, erythema, tenderness or bleeding. Comments: No abnormal discharge. No cervical or vaginal lesions. Normal external genitalia. Musculoskeletal:         General: No tenderness or deformity. Normal range of motion. Cervical back: Normal range of motion and neck supple. Skin:     General: Skin is warm and dry. Coloration: Skin is not pale. Neurological:      Mental Status: She is alert and oriented to person, place, and time. Motor: No abnormal muscle tone. Coordination: Coordination normal.   Psychiatric:         Behavior: Behavior normal.         Thought Content: Thought content normal.         Judgment: Judgment normal.         Assessment:          Diagnosis Orders   1. Vaginal discharge  Miscellaneous sendout 3   2. Vaginal odor  Miscellaneous sendout 3        Plan:      Medications placedthis encounter:  No orders of the defined types were placed in this encounter. Orders placedthis encounter:  Orders Placed This Encounter   Procedures    Miscellaneous sendout 3     Standing Status:   Future     Standing Expiration Date:   6/15/2023     Order Specific Question:   Specify Req. Test (1 Test/Order)     Answer:   cultures         Follow up:  Return for Mirena placement. The patient was asked if she would like a chaperone present for her intimate exam. She  Declined the chaperone.  Fátima Back MD

## 2022-06-22 ENCOUNTER — TELEPHONE (OUTPATIENT)
Dept: OBGYN CLINIC | Age: 47
End: 2022-06-22

## 2022-06-23 DIAGNOSIS — N89.8 VAGINAL ODOR: ICD-10-CM

## 2022-06-23 DIAGNOSIS — N89.8 VAGINAL DISCHARGE: ICD-10-CM

## 2022-06-27 RX ORDER — METRONIDAZOLE 500 MG/1
500 TABLET ORAL 2 TIMES DAILY
Qty: 14 TABLET | Refills: 0 | Status: SHIPPED | OUTPATIENT
Start: 2022-06-27 | End: 2022-10-12 | Stop reason: SDUPTHER

## 2022-07-12 ENCOUNTER — PROCEDURE VISIT (OUTPATIENT)
Dept: OBGYN CLINIC | Age: 47
End: 2022-07-12
Payer: COMMERCIAL

## 2022-07-12 VITALS
SYSTOLIC BLOOD PRESSURE: 110 MMHG | BODY MASS INDEX: 22.15 KG/M2 | WEIGHT: 125 LBS | DIASTOLIC BLOOD PRESSURE: 68 MMHG | HEIGHT: 63 IN

## 2022-07-12 DIAGNOSIS — Z30.431 CHECKING OF INTRAUTERINE DEVICE: ICD-10-CM

## 2022-07-12 DIAGNOSIS — Z30.430 ENCOUNTER FOR IUD INSERTION: ICD-10-CM

## 2022-07-12 DIAGNOSIS — Z32.02 URINE PREGNANCY TEST NEGATIVE: ICD-10-CM

## 2022-07-12 DIAGNOSIS — N92.0 EXCESSIVE OR FREQUENT MENSTRUATION: Primary | ICD-10-CM

## 2022-07-12 LAB
HCG, URINE, POC: NEGATIVE
Lab: NORMAL
NEGATIVE QC PASS/FAIL: NORMAL
POSITIVE QC PASS/FAIL: NORMAL

## 2022-07-12 PROCEDURE — 81025 URINE PREGNANCY TEST: CPT | Performed by: OBSTETRICS & GYNECOLOGY

## 2022-07-12 PROCEDURE — 58301 REMOVE INTRAUTERINE DEVICE: CPT | Performed by: OBSTETRICS & GYNECOLOGY

## 2022-07-12 PROCEDURE — 99212 OFFICE O/P EST SF 10 MIN: CPT | Performed by: OBSTETRICS & GYNECOLOGY

## 2022-07-12 RX ORDER — LEVONORGESTREL 52 MG/1
INTRAUTERINE DEVICE INTRAUTERINE
COMMUNITY
Start: 2022-06-16

## 2022-07-12 RX ORDER — ACETAMINOPHEN AND CODEINE PHOSPHATE 120; 12 MG/5ML; MG/5ML
1 SOLUTION ORAL DAILY
Qty: 84 TABLET | Refills: 1 | Status: SHIPPED | OUTPATIENT
Start: 2022-07-12

## 2022-07-12 ASSESSMENT — ENCOUNTER SYMPTOMS
EYES NEGATIVE: 1
BLOOD IN STOOL: 0
ALLERGIC/IMMUNOLOGIC NEGATIVE: 1
CONSTIPATION: 0
RECTAL PAIN: 0
DIARRHEA: 0
ANAL BLEEDING: 0
NAUSEA: 0
VOMITING: 0
RESPIRATORY NEGATIVE: 1
ABDOMINAL PAIN: 0
ABDOMINAL DISTENTION: 0

## 2022-07-12 NOTE — PROGRESS NOTES
IUD Insertion:   Patient here for IUD ( Mirena ) placement. Urine HCG negative and denies unprotected intercourse. Cervix prepped in routine and sterile fashion. Internal cervical os very stenotic. Multiple attempts at cervical dilatation unsuccessful. Procedure terminated. Discussed other options for contraception. Discussed starting Micronor. Risks and benefits discussed and Rx sent. F/U 3 months for BP check. All questions answered. Vitals:  /68   Ht 5' 3\" (1.6 m)   Wt 125 lb (56.7 kg)   LMP 06/03/2022   BMI 22.14 kg/m²   Past Medical History:   Diagnosis Date    Anxiety disorder     DDD (degenerative disc disease), lumbar     Fistula     Anal     HPV in female     Peripheral neuropathy     alcohol induced    Radiculopathy     Recovering alcoholic ([address]      Past Surgical History:   Procedure Laterality Date    COLONOSCOPY      OVAL / ROUND WINDOW FISTULA      RV repair    AR I&D RECTAL SUBMUCOSAL ABSCESS N/A 9/12/2018    RECTAL ABSCESS INCISION AND DRAINAGE, PAT ON ADMIT performed by Jesus Miller MD at [facility]:  Patient has no known allergies. Current Outpatient Medications   Medication Sig Dispense Refill    norethindrone (ORTHO MICRONOR) 0.35 MG tablet Take 1 tablet by mouth daily 84 tablet 1    MIRENA, 52 MG, IUD 52 mg       Turmeric 400 MG CAPS       vitamin D (CHOLECALCIFEROL) 1000 UNIT TABS tablet Take 1,000 Units by mouth daily      vitamin C (ASCORBIC ACID) 500 MG tablet Take 500 mg by mouth daily      Glucosamine-Chondroitin (GLUCOSAMINE CHONDR COMPLEX PO) Take by mouth      folic acid (FOLVITE) 1 MG tablet       vitamin B-1 (THIAMINE) 100 MG tablet   2     No current facility-administered medications for this visit.      Social History     Socioeconomic History    Marital status:      Spouse name: Not on file    Number of children: Not on file    Years of education: Not on file    Highest education level: Not on file Occupational History    Not on file   Tobacco Use    Smoking status: Former Smoker     Packs/day: 0.25     Start date: 1990     Quit date: 2014     Years since quittin.8    Smokeless tobacco: Never Used   Substance and Sexual Activity    Alcohol use: No     Alcohol/week: 56.0 standard drinks     Types: 56 Cans of beer per week     Comment: former alcoholic     Drug use: No    Sexual activity: Yes     Partners: Male     Birth control/protection: Pill     Comment: OCP   Other Topics Concern    Not on file   Social History Narrative    Not on file     Social Determinants of Health     Financial Resource Strain:     Difficulty of Paying Living Expenses: Not on file   Food Insecurity:     Worried About Running Out of Food in the Last Year: Not on file    Kellen of Food in the Last Year: Not on file   Transportation Needs:     Lack of Transportation (Medical): Not on file    Lack of Transportation (Non-Medical):  Not on file   Physical Activity:     Days of Exercise per Week: Not on file    Minutes of Exercise per Session: Not on file   Stress:     Feeling of Stress : Not on file   Social Connections:     Frequency of Communication with Friends and Family: Not on file    Frequency of Social Gatherings with Friends and Family: Not on file    Attends Christianity Services: Not on file    Active Member of [address] [place] [address] or Organizations: Not on file    Attends Club or Organization Meetings: Not on file    Marital Status: Not on file   Intimate Partner Violence:     Fear of Current or Ex-Partner: Not on file    Emotionally Abused: Not on file    Physically Abused: Not on file    Sexually Abused: Not on file   Housing Stability:     Unable to Pay for Housing in the Last Year: Not on file    Number of [place] in the Last Year: Not on file    Unstable Housing in the Last Year: Not on file     Family History   Problem Relation Age of Onset    Heart Disease Father     High Blood Pressure Father     Other Father         pancreatitis    Diabetes Maternal Grandmother     Diabetes Maternal Grandfather     Cancer Mother         Venkata Lee No Known Problems Paternal Grandfather     No Known Problems Paternal Grandmother     No Known Problems Brother     No Known Problems Sister     No Known Problems Other     Breast Cancer Neg Hx     Colon Cancer Neg Hx     Eclampsia Neg Hx     Hypertension Neg Hx     Ovarian Cancer Neg Hx      Labor Neg Hx     Spont Abortions Neg Hx     Stroke Neg Hx        Review of Systems   Constitutional: Negative. Negative for activity change, appetite change, chills, diaphoresis, fatigue, fever and unexpected weight change. HENT: Negative. Eyes: Negative. Respiratory: Negative. Cardiovascular: Negative. Gastrointestinal: Negative for abdominal distention, abdominal pain, anal bleeding, blood in stool, constipation, diarrhea, nausea, rectal pain and vomiting. Endocrine: Negative. Genitourinary: Positive for menstrual problem (Irregular). Negative for decreased urine volume, difficulty urinating, dyspareunia, dysuria, enuresis, flank pain, frequency, genital sores, hematuria, pelvic pain, urgency, vaginal bleeding, vaginal discharge and vaginal pain. Musculoskeletal: Negative. Skin: Negative. Allergic/Immunologic: Negative. Neurological: Negative. Hematological: Negative. Psychiatric/Behavioral: Negative.        :     Physical Exam  Constitutional:       General: She is not in acute distress. Appearance: Normal appearance. She is well-developed. She is not diaphoretic. HENT:      Head: Normocephalic and atraumatic. Eyes:      Conjunctiva/sclera: Conjunctivae normal.   Cardiovascular:      Rate and Rhythm: Normal rate and regular rhythm. Pulmonary:      Effort: Pulmonary effort is normal. No respiratory distress. Abdominal:      General: There is no distension or abdominal bruit. Palpations: Abdomen is soft. Abdomen is not rigid. There is no shifting dullness, fluid wave, hepatomegaly, splenomegaly, mass or pulsatile mass. Tenderness: There is no abdominal tenderness. There is no guarding or rebound. Negative signs include Brown's sign and McBurney's sign. Hernia: No hernia is present. There is no hernia in the umbilical area, ventral area, left inguinal area or right inguinal area. Genitourinary:     Labia:         Right: No rash, tenderness, lesion or injury. Left: No rash, tenderness, lesion or injury. Urethra: No prolapse, urethral pain, urethral swelling or urethral lesion. Vagina: Normal. No signs of injury and foreign body. No vaginal discharge, erythema, tenderness or bleeding. Cervix: No cervical motion tenderness, discharge, friability, lesion, erythema, cervical bleeding or eversion. Uterus: Normal. Not deviated, not enlarged, not fixed, not tender and no uterine prolapse. Adnexa: Right adnexa normal and left adnexa normal.        Right: No mass, tenderness or fullness. Left: No mass, tenderness or fullness. Rectum: Normal.      Comments: No cervical masses or CMT. No pelvic or adnexal masses; NT.    Musculoskeletal:         General: No tenderness or deformity. Normal range of motion. Cervical back: Normal range of motion and neck supple. Skin:     General: Skin is warm and dry. Coloration: Skin is not pale. Neurological:      Mental Status: She is alert and oriented to person, place, and time. Motor: No abnormal muscle tone. Coordination: Coordination normal.   Psychiatric:         Behavior: Behavior normal.         Thought Content: Thought content normal.         Judgment: Judgment normal.         Assessment:      Diagnosis Orders   1. Excessive or frequent menstruation  POC Pregnancy Urine Qual   2. Urine pregnancy test negative     3. Encounter for IUD insertion     4.  Checking of intrauterine device           :

## 2022-10-10 ENCOUNTER — TELEPHONE (OUTPATIENT)
Dept: OBGYN CLINIC | Age: 47
End: 2022-10-10

## 2022-10-11 NOTE — TELEPHONE ENCOUNTER
Patient is requesting medication for BV and is wanting the same medication as previously treated for in July.    (Flagyl for 7days)     Patient refused walk - in clinic    Please advise and thank you,

## 2022-10-12 RX ORDER — METRONIDAZOLE 500 MG/1
500 TABLET ORAL 2 TIMES DAILY
Qty: 14 TABLET | Refills: 0 | Status: SHIPPED | OUTPATIENT
Start: 2022-10-12 | End: 2022-10-19

## 2022-12-15 ENCOUNTER — OFFICE VISIT (OUTPATIENT)
Dept: OBGYN CLINIC | Age: 47
End: 2022-12-15
Payer: COMMERCIAL

## 2022-12-15 VITALS
BODY MASS INDEX: 21.85 KG/M2 | SYSTOLIC BLOOD PRESSURE: 106 MMHG | DIASTOLIC BLOOD PRESSURE: 74 MMHG | WEIGHT: 128 LBS | HEIGHT: 64 IN

## 2022-12-15 DIAGNOSIS — Z12.31 SCREENING MAMMOGRAM FOR BREAST CANCER: ICD-10-CM

## 2022-12-15 DIAGNOSIS — Z01.419 ENCOUNTER FOR WELL WOMAN EXAM WITH ROUTINE GYNECOLOGICAL EXAM: Primary | ICD-10-CM

## 2022-12-15 PROCEDURE — 99396 PREV VISIT EST AGE 40-64: CPT | Performed by: OBSTETRICS & GYNECOLOGY

## 2022-12-15 ASSESSMENT — ENCOUNTER SYMPTOMS
VOMITING: 0
NAUSEA: 0
CONSTIPATION: 0
ANAL BLEEDING: 0
RESPIRATORY NEGATIVE: 1
ABDOMINAL PAIN: 0
DIARRHEA: 0
ABDOMINAL DISTENTION: 0
ALLERGIC/IMMUNOLOGIC NEGATIVE: 1
BLOOD IN STOOL: 0
EYES NEGATIVE: 1
RECTAL PAIN: 0

## 2022-12-15 ASSESSMENT — VISUAL ACUITY: OU: 1

## 2022-12-15 NOTE — PROGRESS NOTES
The patient was asked if she would like a chaperone present for her intimate exam. She  Declined the chaperone.  Scott Felty, CMA ([address] [place] [address])

## 2022-12-15 NOTE — PROGRESS NOTES
Subjective:      Patient ID: Gerson Ibrahim is a 50 y.o. female    Annual exam. Reviewed medical, surgical, social and family history. Also reviewed current medications and allergies. No GYN complaints. Normal cycles. Pap deferred ( negative co-testing 2021; next pap 2024 ). Screening mammogram ordered. STD screening offered. Monthly SBE encouraged. F/U annual or prn. Vitals:  /74   Ht 5' 3.5\" (1.613 m)   Wt 128 lb (58.1 kg)   LMP 12/10/2022   BMI 22.32 kg/m²   Past Medical History:   Diagnosis Date    Anxiety disorder     DDD (degenerative disc disease), lumbar     Fistula     Anal     HPV in female     Peripheral neuropathy     alcohol induced    Radiculopathy     Recovering alcoholic ([address]      Past Surgical History:   Procedure Laterality Date    COLONOSCOPY      OVAL / ROUND WINDOW FISTULA      RV repair    FL I&D RECTAL SUBMUCOSAL ABSCESS N/A 9/12/2018    RECTAL ABSCESS INCISION AND DRAINAGE, PAT ON ADMIT performed by Glenn Priest MD at [facility]:  Patient has no known allergies. Current Outpatient Medications   Medication Sig Dispense Refill    MULTIPLE VITAMIN PO Take by mouth      Turmeric 400 MG CAPS       vitamin D (CHOLECALCIFEROL) 1000 UNIT TABS tablet Take 1,000 Units by mouth daily      vitamin C (ASCORBIC ACID) 500 MG tablet Take 500 mg by mouth daily      Glucosamine-Chondroitin (GLUCOSAMINE CHONDR COMPLEX PO) Take by mouth      folic acid (FOLVITE) 1 MG tablet       vitamin B-1 (THIAMINE) 100 MG tablet   2    norethindrone (ORTHO MICRONOR) 0.35 MG tablet Take 1 tablet by mouth daily (Patient not taking: Reported on 12/15/2022) 84 tablet 1     No current facility-administered medications for this visit.      Social History     Socioeconomic History    Marital status:      Spouse name: Not on file    Number of children: Not on file    Years of education: Not on file    Highest education level: Not on file   Occupational History    Not on file Tobacco Use    Smoking status: Former     Packs/day: 0.25     Types: Cigarettes     Start date: 1990     Quit date: 2014     Years since quittin.3    Smokeless tobacco: Never   Substance and Sexual Activity    Alcohol use: No     Alcohol/week: 56.0 standard drinks     Types: 64 Cans of beer per week     Comment: former alcoholic     Drug use: No    Sexual activity: Yes     Partners: Male     Birth control/protection: Pill     Comment: OCP   Other Topics Concern    Not on file   Social History Narrative    Not on file     Social Determinants of Health     Financial Resource Strain: Not on file   Food Insecurity: Not on file   Transportation Needs: Not on file   Physical Activity: Not on file   Stress: Not on file   Social Connections: Not on file   Intimate Partner Violence: Not on file   Housing Stability: Not on file     Family History   Problem Relation Age of Onset    Heart Disease Father     High Blood Pressure Father     Other Father         pancreatitis    Diabetes Maternal Grandmother     Diabetes Maternal Grandfather     Cancer Mother         uteran    No Known Problems Paternal Grandfather     No Known Problems Paternal Grandmother     No Known Problems Brother     No Known Problems Sister     No Known Problems Other     Breast Cancer Neg Hx     Colon Cancer Neg Hx     Eclampsia Neg Hx     Hypertension Neg Hx     Ovarian Cancer Neg Hx      Labor Neg Hx     Spont Abortions Neg Hx     Stroke Neg Hx        Review of Systems   Constitutional: Negative. Negative for activity change, appetite change, chills, diaphoresis, fatigue, fever and unexpected weight change. HENT: Negative. Eyes: Negative. Respiratory: Negative. Cardiovascular: Negative. Gastrointestinal:  Negative for abdominal distention, abdominal pain, anal bleeding, blood in stool, constipation, diarrhea, nausea, rectal pain and vomiting. Endocrine: Negative.     Genitourinary:  Negative for decreased urine volume, difficulty urinating, dyspareunia, dysuria, enuresis, flank pain, frequency, genital sores, hematuria, menstrual problem, pelvic pain, urgency, vaginal bleeding, vaginal discharge and vaginal pain. Musculoskeletal: Negative. Skin: Negative. Allergic/Immunologic: Negative. Neurological: Negative. Hematological: Negative. Psychiatric/Behavioral: Negative. Objective:     Physical Exam  Constitutional:       Appearance: She is well-developed. HENT:      Head: Normocephalic. Eyes:      General: Lids are normal. Vision grossly intact. Neck:      Thyroid: No thyromegaly. Cardiovascular:      Rate and Rhythm: Normal rate and regular rhythm. Heart sounds: Normal heart sounds. Pulmonary:      Effort: Pulmonary effort is normal. No respiratory distress. Breath sounds: Normal breath sounds. No wheezing or rales. Chest:      Chest wall: No tenderness. Breasts:     Right: Normal. No swelling, bleeding, inverted nipple, mass, nipple discharge, skin change or tenderness. Left: Normal. No swelling, bleeding, inverted nipple, mass, nipple discharge, skin change or tenderness. Abdominal:      General: There is no distension. Palpations: Abdomen is soft. There is no mass. Tenderness: There is no abdominal tenderness. There is no guarding or rebound. Hernia: No hernia is present. There is no hernia in the left inguinal area or right inguinal area. Genitourinary:     General: Normal vulva. Pubic Area: No rash. Labia:         Right: No rash, tenderness, lesion or injury. Left: No rash, tenderness, lesion or injury. Urethra: No prolapse, urethral swelling or urethral lesion. Vagina: Normal. No signs of injury and foreign body. No vaginal discharge, erythema, tenderness or bleeding. Cervix: No cervical motion tenderness, discharge or friability. Uterus: Not deviated, not enlarged, not fixed and not tender.        Adnexa: Right: No mass, tenderness or fullness. Left: No mass, tenderness or fullness. Rectum: Normal.   Musculoskeletal:         General: No tenderness. Normal range of motion. Cervical back: Normal range of motion and neck supple. Lymphadenopathy:      Cervical: No cervical adenopathy. Upper Body:      Right upper body: No supraclavicular or axillary adenopathy. Left upper body: No supraclavicular or axillary adenopathy. Lower Body: No right inguinal adenopathy. No left inguinal adenopathy. Skin:     General: Skin is warm and dry. Coloration: Skin is not pale. Findings: No erythema or rash. Neurological:      Mental Status: She is alert and oriented to person, place, and time. Psychiatric:         Behavior: Behavior normal.         Thought Content: Thought content normal.         Judgment: Judgment normal.       Assessment:      Diagnosis Orders   1. Encounter for well woman exam with routine gynecological exam        2. Screening mammogram for breast cancer  MADI DIGITAL SCREEN W OR WO CAD BILATERAL            Plan:      Medications placedthis encounter:  No orders of the defined types were placed in this encounter. Orders placedthis encounter:  Orders Placed This Encounter   Procedures    MADI DIGITAL SCREEN W OR WO CAD BILATERAL     Standing Status:   Future     Standing Expiration Date:   12/15/2023         Follow up:  Return in about 1 year (around 12/15/2023) for Annual.  Repeat Annual GYN exam every 1 year. Cervical Cytology exam starts age 24. If Negative Cytology;  follow-up screening per current guidelines. Mammograms yearly starting at age 36. Calcium and Vitamin D dosing reviewed ( age appropriate ). Colonoscopy and bone density screening discussed ( age appropriate ). Birth control and STD prevention discussed ( age appropriate ). Gardisil counseling completed for all patients ( age appropriate ).     Routine health maintenance ( per PCP and guidelines ).

## 2023-01-03 RX ORDER — ACETAMINOPHEN AND CODEINE PHOSPHATE 120; 12 MG/5ML; MG/5ML
SOLUTION ORAL
Qty: 84 TABLET | Refills: 3 | Status: SHIPPED | OUTPATIENT
Start: 2023-01-03

## 2023-02-01 DIAGNOSIS — Z12.31 BREAST CANCER SCREENING BY MAMMOGRAM: Primary | ICD-10-CM

## 2023-02-13 ENCOUNTER — TELEPHONE (OUTPATIENT)
Dept: OBGYN CLINIC | Age: 48
End: 2023-02-13

## 2023-02-13 NOTE — TELEPHONE ENCOUNTER
Pt states that she is currently taking Micronor and states that she is having hot flashes at night. Pt wanted to take Relizen, a hormone supplement, to help with the hot flashes but would like to know if there are any issues with taking both medications. Please advise if pt needs an apt to discuss. Pt was last seen for her annual on 12-15-22.

## 2023-12-04 RX ORDER — ACETAMINOPHEN AND CODEINE PHOSPHATE 120; 12 MG/5ML; MG/5ML
SOLUTION ORAL
Qty: 84 TABLET | Refills: 3 | OUTPATIENT
Start: 2023-12-04

## 2024-05-03 ENCOUNTER — TELEPHONE (OUTPATIENT)
Dept: PRIMARY CARE | Facility: CLINIC | Age: 49
End: 2024-05-03

## 2024-05-03 NOTE — TELEPHONE ENCOUNTER
Harini called in, asked to re-establish care with Dr. Hicks and she would also like to be seen for her annual physical. She last saw Dr. Hicks in 11/2014. Please advise.

## 2024-05-07 NOTE — TELEPHONE ENCOUNTER
SPOKE WITH TOSHIA, DID GET HER SCHEDULED WITH [facility] FOR 7/30 @ 9:15AM I WASN'T ABLE TO SCHEDULE FOR A NPV AND HAD TO SCHEDULE IT FOR AN ESTABLISHED VISIT. PLEASE ADVISE

## 2024-07-26 PROBLEM — K62.1 RECTAL POLYP: Status: ACTIVE | Noted: 2024-07-26

## 2024-07-26 PROBLEM — K61.1 PERIRECTAL ABSCESS: Status: ACTIVE | Noted: 2024-07-26

## 2024-07-26 PROBLEM — K63.89 MELANOSIS COLI: Status: ACTIVE | Noted: 2024-07-26

## 2024-07-26 RX ORDER — MULTIVIT WITH CALCIUM,IRON,MIN 18MG-0.4MG
TABLET ORAL
COMMUNITY

## 2024-07-26 RX ORDER — ASCORBIC ACID 500 MG
500 TABLET ORAL DAILY
COMMUNITY

## 2024-07-26 RX ORDER — CHOLECALCIFEROL (VITAMIN D3) 25 MCG
1000 TABLET ORAL DAILY
COMMUNITY

## 2024-07-26 RX ORDER — CICLOPIROX 7.7 MG/G
GEL TOPICAL
COMMUNITY
Start: 2024-04-30

## 2024-07-26 RX ORDER — BISMUTH SUBSALICYLATE 262 MG
TABLET,CHEWABLE ORAL
COMMUNITY

## 2024-07-29 NOTE — PROGRESS NOTES
"Subjective   Patient ID: Harini Quevedo is a 49 y.o. female who presents for New Patient Visit (RE- Establish care ) and Annual Exam.    Patient presents today for annual physical exam.     Patient is in the office to re- establish care. Previous pcp DR. HERNANDEZ.    Patient quit smoking 1.5 years ago. Stopped 4/26/2023  She is no longer drinking. Sober since 9/1/2015.   She took up running for exercise. She runs 7 miles a day. It is very good for her stress levels.   Mammogram done at [facility] May 2024.  Colonoscopy done in 2018 with Dr. Vasquez.   Father has history of heart attack in 50-60's and stroke. 7 stents placed.     Review of Systems   Constitutional:  Negative for chills and fever.   HENT:  Negative for congestion, ear pain, nosebleeds, rhinorrhea and sore throat.    Respiratory:  Negative for cough, shortness of breath and wheezing.    Cardiovascular:  Negative for chest pain, palpitations and leg swelling.   Gastrointestinal:  Negative for abdominal pain, constipation, diarrhea and vomiting.   Genitourinary:  Negative for dysuria, frequency and hematuria.   Neurological:  Negative for dizziness, tremors, numbness and headaches.       Objective   /68 (BP Location: Right arm, Patient Position: Sitting)   Pulse 56   Temp 36.3 °C (97.4 °F)   Resp 16   Ht 1.613 m (5' 3.5\")   Wt 59.4 kg (131 lb)   SpO2 99%   BMI 22.84 kg/m²     Physical Exam  Constitutional:       General: She is not in acute distress.     Appearance: Normal appearance.   HENT:      Head: Normocephalic and atraumatic.      Mouth/Throat:      Mouth: Mucous membranes are moist.      Pharynx: Oropharynx is clear. No oropharyngeal exudate or posterior oropharyngeal erythema.   Eyes:      General: No scleral icterus.     Extraocular Movements: Extraocular movements intact.      Pupils: Pupils are equal, round, and reactive to light.   Cardiovascular:      Rate and Rhythm: Normal rate and regular rhythm.      Pulses: Normal pulses.      Heart " sounds: No murmur heard.     No friction rub. No gallop.   Pulmonary:      Effort: Pulmonary effort is normal.      Breath sounds: No wheezing, rhonchi or rales.   Skin:     General: Skin is warm.      Coloration: Skin is not jaundiced or pale.      Findings: No erythema or rash.   Neurological:      General: No focal deficit present.      Mental Status: She is alert and oriented to person, place, and time.      Cranial Nerves: No cranial nerve deficit.      Sensory: No sensory deficit.      Coordination: Coordination normal.      Gait: Gait normal.         Assessment/Plan   Problem List Items Addressed This Visit       Healthcare maintenance     The nature of cardiac risk has been fully discussed with this patient. Discussed cardiovascular risk analysis and appropriate diet with the need for lifelong measures to reduce the risk. A regular exercise program is recommended to help achieve and maintain normal body weight, fitness and improve lipid balance. Patient education provided. They understand and agree with this course of treatment. They will return with new or worsening symptoms. Patient instructed to remain current with appropriate annual health maintenance.            Relevant Orders    Comprehensive Metabolic Panel (Completed)    Lipid Panel (Completed)    Hyperglycemia     A1C ordered.          Relevant Orders    Hemoglobin A1C (Completed)    Mixed hyperlipidemia     The nature of cardiac risk has been fully discussed with this patient. Discussed cardiovascular risk analysis and appropriate diet with the need for lifelong measures to reduce the risk. A regular exercise program is recommended to help achieve and maintain normal body weight, fitness and improve lipid balance. Patient education provided. They understand and agree with this course of treatment. They will return with new or worsening symptoms. Patient instructed to remain current with appropriate annual health maintenance.      Patient declines  statins.           Other Visit Diagnoses       Screening for malignant neoplasm of colon        Relevant Orders    Colonoscopy Screening; Average Risk Patient    History of colon polyps        Relevant Orders    Colonoscopy Screening; Average Risk Patient             .Scribe Attestation  By signing my name below, I, Tahira Fallon   attest that this documentation has been prepared under the direction and in the presence of Samuel Hicks MD .

## 2024-07-30 ENCOUNTER — APPOINTMENT (OUTPATIENT)
Dept: PRIMARY CARE | Facility: CLINIC | Age: 49
End: 2024-07-30
Payer: COMMERCIAL

## 2024-07-30 ENCOUNTER — LAB (OUTPATIENT)
Dept: LAB | Facility: LAB | Age: 49
End: 2024-07-30
Payer: COMMERCIAL

## 2024-07-30 VITALS
OXYGEN SATURATION: 99 % | HEIGHT: 64 IN | SYSTOLIC BLOOD PRESSURE: 104 MMHG | WEIGHT: 131 LBS | BODY MASS INDEX: 22.36 KG/M2 | TEMPERATURE: 97.4 F | RESPIRATION RATE: 16 BRPM | HEART RATE: 56 BPM | DIASTOLIC BLOOD PRESSURE: 68 MMHG

## 2024-07-30 DIAGNOSIS — E78.2 MIXED HYPERLIPIDEMIA: ICD-10-CM

## 2024-07-30 DIAGNOSIS — Z86.010 HISTORY OF COLON POLYPS: ICD-10-CM

## 2024-07-30 DIAGNOSIS — Z12.11 SCREENING FOR MALIGNANT NEOPLASM OF COLON: ICD-10-CM

## 2024-07-30 DIAGNOSIS — R73.9 HYPERGLYCEMIA: ICD-10-CM

## 2024-07-30 DIAGNOSIS — Z00.00 HEALTHCARE MAINTENANCE: ICD-10-CM

## 2024-07-30 PROBLEM — E78.00 ELEVATED CHOLESTEROL: Status: ACTIVE | Noted: 2024-07-30

## 2024-07-30 PROBLEM — K61.1 PERIRECTAL ABSCESS: Status: RESOLVED | Noted: 2024-07-26 | Resolved: 2024-07-30

## 2024-07-30 PROBLEM — I83.90 VARICOSE VEINS OF LOWER EXTREMITY: Status: ACTIVE | Noted: 2024-07-30

## 2024-07-30 LAB
ALBUMIN SERPL BCP-MCNC: 4.4 G/DL (ref 3.4–5)
ALP SERPL-CCNC: 70 U/L (ref 33–110)
ALT SERPL W P-5'-P-CCNC: 10 U/L (ref 7–45)
ANION GAP SERPL CALC-SCNC: 9 MMOL/L (ref 10–20)
AST SERPL W P-5'-P-CCNC: 14 U/L (ref 9–39)
BILIRUB SERPL-MCNC: 0.6 MG/DL (ref 0–1.2)
BUN SERPL-MCNC: 15 MG/DL (ref 6–23)
CALCIUM SERPL-MCNC: 9.2 MG/DL (ref 8.6–10.3)
CHLORIDE SERPL-SCNC: 101 MMOL/L (ref 98–107)
CHOLEST SERPL-MCNC: 242 MG/DL (ref 0–199)
CHOLESTEROL/HDL RATIO: 3.7
CO2 SERPL-SCNC: 32 MMOL/L (ref 21–32)
CREAT SERPL-MCNC: 0.86 MG/DL (ref 0.5–1.05)
EGFRCR SERPLBLD CKD-EPI 2021: 82 ML/MIN/1.73M*2
EST. AVERAGE GLUCOSE BLD GHB EST-MCNC: 111 MG/DL
GLUCOSE SERPL-MCNC: 89 MG/DL (ref 74–99)
HBA1C MFR BLD: 5.5 %
HDLC SERPL-MCNC: 64.6 MG/DL
LDLC SERPL CALC-MCNC: 162 MG/DL
NON HDL CHOLESTEROL: 177 MG/DL (ref 0–149)
POTASSIUM SERPL-SCNC: 3.7 MMOL/L (ref 3.5–5.3)
PROT SERPL-MCNC: 6.7 G/DL (ref 6.4–8.2)
SODIUM SERPL-SCNC: 138 MMOL/L (ref 136–145)
TRIGL SERPL-MCNC: 78 MG/DL (ref 0–149)
VLDL: 16 MG/DL (ref 0–40)

## 2024-07-30 PROCEDURE — 80061 LIPID PANEL: CPT

## 2024-07-30 PROCEDURE — 36415 COLL VENOUS BLD VENIPUNCTURE: CPT

## 2024-07-30 PROCEDURE — 3008F BODY MASS INDEX DOCD: CPT | Performed by: FAMILY MEDICINE

## 2024-07-30 PROCEDURE — 1036F TOBACCO NON-USER: CPT | Performed by: FAMILY MEDICINE

## 2024-07-30 PROCEDURE — 83036 HEMOGLOBIN GLYCOSYLATED A1C: CPT

## 2024-07-30 PROCEDURE — 80053 COMPREHEN METABOLIC PANEL: CPT

## 2024-07-30 PROCEDURE — 99396 PREV VISIT EST AGE 40-64: CPT | Performed by: FAMILY MEDICINE

## 2024-07-30 RX ORDER — LATANOPROST 50 UG/ML
SOLUTION/ DROPS OPHTHALMIC
COMMUNITY
Start: 2024-06-11

## 2024-07-30 RX ORDER — NORETHINDRONE 0.35 MG/1
1 TABLET ORAL DAILY
COMMUNITY

## 2024-07-30 ASSESSMENT — ENCOUNTER SYMPTOMS
SHORTNESS OF BREATH: 0
DYSURIA: 0
FREQUENCY: 0
VOMITING: 0
DIZZINESS: 0
SORE THROAT: 0
HEMATURIA: 0
CHILLS: 0
PALPITATIONS: 0
TREMORS: 0
RHINORRHEA: 0
DIARRHEA: 0
CONSTIPATION: 0
ABDOMINAL PAIN: 0
NUMBNESS: 0
HEADACHES: 0
WHEEZING: 0
FEVER: 0
COUGH: 0

## 2024-07-30 ASSESSMENT — PATIENT HEALTH QUESTIONNAIRE - PHQ9
2. FEELING DOWN, DEPRESSED OR HOPELESS: NOT AT ALL
SUM OF ALL RESPONSES TO PHQ9 QUESTIONS 1 AND 2: 0
1. LITTLE INTEREST OR PLEASURE IN DOING THINGS: NOT AT ALL

## 2024-07-30 NOTE — ASSESSMENT & PLAN NOTE
The nature of cardiac risk has been fully discussed with this patient. Discussed cardiovascular risk analysis and appropriate diet with the need for lifelong measures to reduce the risk. A regular exercise program is recommended to help achieve and maintain normal body weight, fitness and improve lipid balance. Patient education provided. They understand and agree with this course of treatment. They will return with new or worsening symptoms. Patient instructed to remain current with appropriate annual health maintenance.      Patient declines statins.

## 2024-08-28 ENCOUNTER — ANESTHESIA EVENT (OUTPATIENT)
Dept: GASTROENTEROLOGY | Facility: EXTERNAL LOCATION | Age: 49
End: 2024-08-28

## 2024-09-10 ENCOUNTER — APPOINTMENT (OUTPATIENT)
Dept: GASTROENTEROLOGY | Facility: EXTERNAL LOCATION | Age: 49
End: 2024-09-10
Payer: COMMERCIAL

## 2024-09-10 ENCOUNTER — ANESTHESIA (OUTPATIENT)
Dept: GASTROENTEROLOGY | Facility: EXTERNAL LOCATION | Age: 49
End: 2024-09-10

## 2024-09-10 VITALS
DIASTOLIC BLOOD PRESSURE: 82 MMHG | SYSTOLIC BLOOD PRESSURE: 106 MMHG | TEMPERATURE: 97.2 F | RESPIRATION RATE: 9 BRPM | BODY MASS INDEX: 21.68 KG/M2 | HEART RATE: 46 BPM | WEIGHT: 127 LBS | OXYGEN SATURATION: 98 % | HEIGHT: 64 IN

## 2024-09-10 DIAGNOSIS — Z86.010 HISTORY OF COLON POLYPS: ICD-10-CM

## 2024-09-10 DIAGNOSIS — Z12.11 SCREENING FOR MALIGNANT NEOPLASM OF COLON: ICD-10-CM

## 2024-09-10 PROCEDURE — 45385 COLONOSCOPY W/LESION REMOVAL: CPT | Performed by: STUDENT IN AN ORGANIZED HEALTH CARE EDUCATION/TRAINING PROGRAM

## 2024-09-10 RX ORDER — PROPOFOL 10 MG/ML
INJECTION, EMULSION INTRAVENOUS AS NEEDED
Status: DISCONTINUED | OUTPATIENT
Start: 2024-09-10 | End: 2024-09-10

## 2024-09-10 RX ORDER — SODIUM CHLORIDE 9 MG/ML
20 INJECTION, SOLUTION INTRAVENOUS CONTINUOUS
Status: DISCONTINUED | OUTPATIENT
Start: 2024-09-10 | End: 2024-09-11 | Stop reason: HOSPADM

## 2024-09-10 RX ORDER — LIDOCAINE HYDROCHLORIDE 20 MG/ML
INJECTION, SOLUTION INFILTRATION; PERINEURAL AS NEEDED
Status: DISCONTINUED | OUTPATIENT
Start: 2024-09-10 | End: 2024-09-10

## 2024-09-10 RX ORDER — SODIUM CHLORIDE 9 MG/ML
50 INJECTION, SOLUTION INTRAVENOUS CONTINUOUS
Status: CANCELLED | OUTPATIENT
Start: 2024-09-10

## 2024-09-10 SDOH — HEALTH STABILITY: MENTAL HEALTH: CURRENT SMOKER: 0

## 2024-09-10 ASSESSMENT — PAIN SCALES - GENERAL
PAINLEVEL_OUTOF10: 0 - NO PAIN
PAINLEVEL_OUTOF10: 0 - NO PAIN
PAIN_LEVEL: 1
PAINLEVEL_OUTOF10: 0 - NO PAIN

## 2024-09-10 ASSESSMENT — PAIN - FUNCTIONAL ASSESSMENT
PAIN_FUNCTIONAL_ASSESSMENT: 0-10

## 2024-09-10 ASSESSMENT — COLUMBIA-SUICIDE SEVERITY RATING SCALE - C-SSRS
1. IN THE PAST MONTH, HAVE YOU WISHED YOU WERE DEAD OR WISHED YOU COULD GO TO SLEEP AND NOT WAKE UP?: NO
6. HAVE YOU EVER DONE ANYTHING, STARTED TO DO ANYTHING, OR PREPARED TO DO ANYTHING TO END YOUR LIFE?: NO
2. HAVE YOU ACTUALLY HAD ANY THOUGHTS OF KILLING YOURSELF?: NO

## 2024-09-10 NOTE — ANESTHESIA PREPROCEDURE EVALUATION
Patient: Harini Quevedo    Procedure Information       Date/Time: 09/10/24 0820    Scheduled providers: Chuy Adams DO    Procedure: COLONOSCOPY    Location: [facility]            Relevant Problems   Anesthesia (within normal limits)      Cardiac   (+) Elevated cholesterol   (+) Mixed hyperlipidemia      Pulmonary (within normal limits)      Neuro (within normal limits)      GI (within normal limits)      /Renal (within normal limits)      Liver (within normal limits)      Endocrine (within normal limits)      Hematology (within normal limits)      Musculoskeletal (within normal limits)      HEENT (within normal limits)      ID (within normal limits)      Skin (within normal limits)      GYN (within normal limits)       Clinical information reviewed:                   NPO Detail:  No data recorded     Physical Exam    Airway  Mallampati: II  TM distance: >3 FB  Neck ROM: full     Cardiovascular   Rhythm: regular  Rate: normal     Dental    Pulmonary   Breath sounds clear to auscultation     Abdominal   Abdomen: soft  Bowel sounds: normal           Anesthesia Plan    History of general anesthesia?: yes  History of complications of general anesthesia?: no    ASA 2     MAC     The patient is not a current smoker.  Patient was not previously instructed to abstain from smoking on day of procedure.  Education provided regarding risk of obstructive sleep apnea.  intravenous induction   Anesthetic plan and risks discussed with patient.    Plan discussed with CRNA.

## 2024-09-10 NOTE — H&P
Outpatient NR Procedure H&P    Patient Profile  Name Harini Quevedo  Date of Birth 1975  MRN [id number]  PCP Samuel Hicks        Diagnosis: History of polyps.  Procedure(s):  Colonoscopy.    Allergies  No Known Allergies    Past Medical History   Past Medical History:   Diagnosis Date    Arthritis     Ocular hypertension     Perirectal abscess 07/26/2024    Substance abuse (Multi) 8-2010       Medication Reviewed - yes  Prior to Admission medications    Medication Sig Start Date End Date Taking? Authorizing Provider   ascorbic acid (Vitamin C) 500 mg tablet Take 1 tablet (500 mg) by mouth once daily.   Yes Historical Provider, MD   cholecalciferol (Vitamin D-3) 25 MCG (1000 UT) tablet Take 1 tablet (1,000 Units) by mouth once daily.   Yes Historical Provider, MD   ciclopirox (Loprox) 0.77 % gel APPLY TO AFFECTED TOENAILS TWICE DAILY 4/30/24  Yes Historical Provider, MD   cyanocobalamin, vitamin B-12, (VITAMIN B-12 ORAL)    Yes Historical Provider, MD   glucosamine-chondroitin 500-400 mg capsule Take by mouth.   Yes Historical Provider, MD   latanoprost (Xalatan) 0.005 % ophthalmic solution INSTILL 1 DROP INTO BOTH EYES EVERY NIGHT AT BEDTIME 6/11/24  Yes Historical Provider, MD   multivitamin tablet Take by mouth.   Yes Historical Provider, MD   norethindrone (Micronor) 0.35 mg tablet Take 1 tablet (0.35 mg) by mouth once daily.   Yes Historical Provider, MD       Physical Exam  Vitals:    09/10/24 0748   BP: 107/72   Pulse: 52   Resp: (!) 8   Temp: 36.3 °C (97.3 °F)   SpO2: 100%      Weight   Vitals:    09/10/24 0748   Weight: 57.6 kg (127 lb)     BMI Body mass index is 22.14 kg/m².    General: A&Ox3, NAD.  HEENT: AT/NC.   CV: RRR. No murmur.  Resp: CTA bilaterally. No wheezing, rhonchi or rales.   GI: Soft, NT/ND.   Extrem: No edema. Pulses intact.  Skin: No Jaundice.   Neuro: No focal deficits.   Psych: Normal mood and affect.        Oropharyngeal Classification II (hard and soft palate, upper  portion of tonsils and uvula visible)  ASA PS Classification 2  Sedation Plan: Deep Sedation.  Procedure Plan - pre-procedural (re)assesment completed by physician:  discharge/transfer patient when discharge criteria met    Chuy Adams DO  9/10/2024 8:02 AM

## 2024-09-10 NOTE — ANESTHESIA POSTPROCEDURE EVALUATION
Patient: Harini Quevedo    Procedure Summary       Date: 09/10/24 Room / Location: [facility]    Anesthesia Start: 0812 Anesthesia Stop: 0836    Procedure: COLONOSCOPY Diagnosis: History of colon polyps    Scheduled Providers: Chuy Adams DO Responsible Provider: EFE Chao    Anesthesia Type: MAC ASA Status: 2            Anesthesia Type: MAC    Vitals Value Taken Time   BP 91/63 09/10/24 0834   Temp 36.2 °C (97.2 °F) 09/10/24 0834   Pulse 61 09/10/24 0834   Resp 20 09/10/24 0834   SpO2 99 % 09/10/24 0834       Anesthesia Post Evaluation    Patient location during evaluation: bedside  Patient participation: complete - patient participated  Level of consciousness: awake  Pain score: 1  Pain management: adequate  Airway patency: patent  Cardiovascular status: acceptable  Respiratory status: acceptable  Hydration status: acceptable  Postoperative Nausea and Vomiting: none        No notable events documented.

## 2024-09-10 NOTE — DISCHARGE INSTRUCTIONS

## 2024-09-17 LAB
LABORATORY COMMENT REPORT: NORMAL
PATH REPORT.FINAL DX SPEC: NORMAL
PATH REPORT.GROSS SPEC: NORMAL
PATH REPORT.TOTAL CANCER: NORMAL

## 2025-08-01 ENCOUNTER — APPOINTMENT (OUTPATIENT)
Dept: PRIMARY CARE | Facility: CLINIC | Age: 50
End: 2025-08-01
Payer: COMMERCIAL

## 2025-09-10 ENCOUNTER — APPOINTMENT (OUTPATIENT)
Dept: PRIMARY CARE | Facility: CLINIC | Age: 50
End: 2025-09-10
Payer: COMMERCIAL

## (undated) DEVICE — LABEL MED MINI W/ MARKER

## (undated) DEVICE — GOWN,AURORA,NONREINFORCED,LARGE: Brand: MEDLINE

## (undated) DEVICE — MARKER SURG SKIN GENTIAN VLT REG TIP W/ 6IN RUL

## (undated) DEVICE — TUBING, SUCTION, 1/4" X 10', STRAIGHT: Brand: MEDLINE

## (undated) DEVICE — GOWN,AURORA,NONRNF,XL,30/CS: Brand: MEDLINE

## (undated) DEVICE — PAD,ABDOMINAL,8"X10",ST,LF: Brand: MEDLINE

## (undated) DEVICE — TRAY PREP DRY W/ PREM GLV 2 APPL 6 SPNG 2 UNDPD 1 OVERWRAP

## (undated) DEVICE — SUTURE CHROMIC GUT SZ 3-0 L27IN ABSRB BRN L26MM SH 1/2 CIR G122H

## (undated) DEVICE — PATIENT RETURN ELECTRODE, SINGLE-USE, CONTACT QUALITY MONITORING, ADULT, WITH 9FT CORD, FOR PATIENTS WEIGING OVER 33LBS. (15KG): Brand: MEGADYNE

## (undated) DEVICE — ELECTROSURGICAL PENCIL BUTTON SWITCH E-Z CLEAN COATED BLADE ELECTRODE 10 FT (3 M) CORD HOLSTER: Brand: MEGADYNE

## (undated) DEVICE — GLOVE SURG SZ 75 STD WHT LTX SYN POLYMER BEAD REINF ANTI RL

## (undated) DEVICE — DRAPE,LAP,CHOLE,W/TROUGHS,STERILE: Brand: MEDLINE

## (undated) DEVICE — PACK,SET UP,DRAPE: Brand: MEDLINE

## (undated) DEVICE — INTENDED FOR TISSUE SEPARATION, AND OTHER PROCEDURES THAT REQUIRE A SHARP SURGICAL BLADE TO PUNCTURE OR CUT.: Brand: BARD-PARKER ® CARBON RIB-BACK BLADES

## (undated) DEVICE — SPONGE,LAP,18"X18",DLX,XR,ST,5/PK,40/PK: Brand: MEDLINE

## (undated) DEVICE — COUNTER NDL 40 COUNT HLD 70 FOAM BLK ADH W/ MAG